# Patient Record
Sex: MALE | Race: WHITE
[De-identification: names, ages, dates, MRNs, and addresses within clinical notes are randomized per-mention and may not be internally consistent; named-entity substitution may affect disease eponyms.]

---

## 2023-05-17 ENCOUNTER — HOSPITAL ENCOUNTER (EMERGENCY)
Dept: HOSPITAL 87 - ER | Age: 38
LOS: 1 days | Discharge: HOME | End: 2023-05-18
Payer: COMMERCIAL

## 2023-05-17 VITALS — WEIGHT: 185.19 LBS | BODY MASS INDEX: 26.51 KG/M2 | HEIGHT: 70 IN

## 2023-05-17 VITALS — DIASTOLIC BLOOD PRESSURE: 85 MMHG | SYSTOLIC BLOOD PRESSURE: 125 MMHG

## 2023-05-17 DIAGNOSIS — Z88.5: ICD-10-CM
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
## 2023-05-17 DIAGNOSIS — T15.92XA: Primary | ICD-10-CM
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
## 2023-05-17 PROCEDURE — 99283 EMERGENCY DEPT VISIT LOW MDM: CPT

## 2023-05-18 ENCOUNTER — NURSE TRIAGE (OUTPATIENT)
Dept: NURSING | Facility: CLINIC | Age: 38
End: 2023-05-18

## 2023-05-18 ENCOUNTER — TELEPHONE (OUTPATIENT)
Dept: CALL CENTER | Age: 38
End: 2023-05-18
Payer: COMMERCIAL

## 2023-05-18 NOTE — TELEPHONE ENCOUNTER
Nurse Triage SBAR    Is this a 2nd Level Triage?  Yes    Situation:   Left eye pain, watery, red, blurred vision    Background/Assessment:    Pt was playing paint ball yesterday with some friends.    Did not get hit in the eye directly with anything.    But was rubbing his eyes constantly yesterday afternoon.    Later in the day.  Pt noticed his left eye was very swollen, red, irritated, painful, blurred vision.          Pt went to the ER.  Was seen by ER providers.  Pt got his eye flushed out and a med to numb the eye for the night until he could be seen in clinic.      The ER Provider thought they could see some particles in the left eye.    So was told to follow up with the eye doctor for further assistance.    Today the eye is red, swollen, painful, irritated, blurred vision and woke up with some matter stuck to his left eye.  No headaches, flashes of light, floaters or dizziness noted.      Pt would like to be seen in clinic.  Pt can be seen tomorrow afternoon.    If calling the Pt after 3:00 PM today.  [place].   So please leave a detail message with a direct line number to speak to a live person in clinic.   Please call [phone] for further assistance.    Thank you     America Verdugo RN  Central Triage Red Flags/Med Refills      Protocol Recommended Disposition:   See in Office Today, See More Appropriate Protocol      Reason for Disposition    Eye pain from foreign body in eye    Patient wants to be seen    Additional Information    Negative: Followed an eye injury    Negative: Eye pain from chemical in the eye    Negative: Doesn't sound like FB in the eye    Negative: Foreign body is a chemical    Negative: Foreign body (FB) hit eye at high speed (e.g., small metallic chip from hammering, lawnmower, BB gun, explosion)    Negative: Foreign body (FB) stuck on eyeball    Negative: Sharp FB (even if FB was removed) and any pain present now    Negative: Eye has been washed out > 30  minutes ago and still feels like FB is still present    Negative: Blurred vision that persists >1 hour after irrigation (regardless of duration of flushing)    Negative: Eye pain that persists > 1 hour after irrigation (regardless of duration of flushing)    Negative: Tearing or blinking that persists > 1 hour since irrigation (regardless of duration of flushing)    Negative: Cloudy spot on the cornea (clear part of the eye)    Negative: Sounds like a serious injury to the triager    Negative: SEVERE eye pain    Negative: Yellow or green pus occurs    Negative: Contact lens stuck in eye and unable to remove using CARE ADVICE    Protocols used: EYE PAIN-A-OH, EYE - FOREIGN BODY-A-OH

## 2023-05-18 NOTE — TELEPHONE ENCOUNTER
Caller reporting the following red-flag symptom(s): Debré in eye or corneal tear    Per the system red-flag symptom policy, patient was instructed to:  speak with a Registered Nurse    Action:  Patient warm transferred to a Registered NurseMary

## 2023-05-18 NOTE — TELEPHONE ENCOUNTER
Reviewed with Dr. Marshall and Dr. Marshall able to see today    Pt states traveling from [place] to [facility] today and arriving at 6 PM    Scheduled exam tomorrow with Dr. Stevenson in afternoon (pt has work obligation in AM0    Pt aware may proceed to [address] [place] [address] ED for any worsening symptoms/vision changes today/tonight    Pt state symptoms/vision have not worsening since ED visit yesterday    Recommended preservative free tears and may use lubricating eye ointment at night until visit    Merlin Unger RN 12:39 PM 05/18/23

## 2023-05-19 ENCOUNTER — OFFICE VISIT (OUTPATIENT)
Dept: OPHTHALMOLOGY | Facility: CLINIC | Age: 38
End: 2023-05-19
Attending: OPTOMETRIST
Payer: COMMERCIAL

## 2023-05-19 DIAGNOSIS — H18.20 CORNEAL EDEMA: Primary | ICD-10-CM

## 2023-05-19 PROCEDURE — 99203 OFFICE O/P NEW LOW 30 MIN: CPT | Performed by: OPTOMETRIST

## 2023-05-19 PROCEDURE — 99211 OFF/OP EST MAY X REQ PHY/QHP: CPT | Performed by: OPTOMETRIST

## 2023-05-19 ASSESSMENT — CONF VISUAL FIELD
OS_NORMAL: 1
OS_INFERIOR_TEMPORAL_RESTRICTION: 0
OD_INFERIOR_TEMPORAL_RESTRICTION: 0
METHOD: COUNTING FINGERS
OD_INFERIOR_NASAL_RESTRICTION: 0
OD_NORMAL: 1
OD_SUPERIOR_TEMPORAL_RESTRICTION: 0
OS_SUPERIOR_TEMPORAL_RESTRICTION: 0
OS_INFERIOR_NASAL_RESTRICTION: 0
OD_SUPERIOR_NASAL_RESTRICTION: 0
OS_SUPERIOR_NASAL_RESTRICTION: 0

## 2023-05-19 ASSESSMENT — VISUAL ACUITY
OS_SC+: -1
OS_PH_SC: 20/25
METHOD: SNELLEN - LINEAR
OS_PH_SC+: -1
OS_SC: 20/40
OD_SC: 20/20

## 2023-05-19 ASSESSMENT — SLIT LAMP EXAM - LIDS
COMMENTS: NORMAL
COMMENTS: NORMAL

## 2023-05-19 ASSESSMENT — EXTERNAL EXAM - RIGHT EYE: OD_EXAM: NORMAL

## 2023-05-19 ASSESSMENT — TONOMETRY
IOP_METHOD: ICARE
OS_IOP_MMHG: 12
OD_IOP_MMHG: 13

## 2023-05-19 ASSESSMENT — EXTERNAL EXAM - LEFT EYE: OS_EXAM: NORMAL

## 2023-05-19 NOTE — PROGRESS NOTES
Assessment & Plan       Dejuan Barron is a 37 year old male with the following diagnoses:   1. Corneal edema - Right Eye        cont lubricants for 3-4 days  Full exam per 2 years     Patient disposition:   No follow-ups on file.          Complete documentation of historical and exam elements from today's encounter can be found in the full encounter summary report (not reduplicated in this progress note). I personally obtained the chief complaint(s) and history of present illness.  I confirmed and edited as necessary the review of systems, past medical/surgical history, family history, social history, and examination findings as documented by others; and I examined the patient myself. I personally reviewed the relevant tests, images, and reports as documented above. I formulated and edited as necessary the assessment and plan and discussed the findings and management plan with the patient and family.  Dr. Nir Stevenson

## 2023-05-19 NOTE — NURSING NOTE
Chief Complaints and History of Present Illnesses   Patient presents with     Foreign Body Left Eye     Left eye     Chief Complaint(s) and History of Present Illness(es)     Foreign Body Left Eye            Comments: Left eye          Comments    Pt states he was at an outing playing [employer], went to ED in [place] with eye pain left eye (not hit with any debris)  States they flushed the the eye and sent him home  Pt states no flashes, floaters eye pain or redness today    Mallika Acosta COT 12:43 PM May 19, 2023

## 2023-05-21 ENCOUNTER — HEALTH MAINTENANCE LETTER (OUTPATIENT)
Age: 38
End: 2023-05-21

## 2023-06-08 ENCOUNTER — OFFICE VISIT (OUTPATIENT)
Dept: FAMILY MEDICINE | Facility: CLINIC | Age: 38
End: 2023-06-08
Payer: COMMERCIAL

## 2023-06-08 VITALS
WEIGHT: 196.5 LBS | HEIGHT: 69 IN | HEART RATE: 74 BPM | SYSTOLIC BLOOD PRESSURE: 118 MMHG | RESPIRATION RATE: 15 BRPM | OXYGEN SATURATION: 97 % | BODY MASS INDEX: 29.1 KG/M2 | DIASTOLIC BLOOD PRESSURE: 79 MMHG | TEMPERATURE: 98.3 F

## 2023-06-08 DIAGNOSIS — R29.818 SUSPECTED SLEEP APNEA: ICD-10-CM

## 2023-06-08 DIAGNOSIS — Z00.00 VISIT FOR PREVENTIVE HEALTH EXAMINATION: Primary | ICD-10-CM

## 2023-06-08 DIAGNOSIS — Z80.8 FAMILY HISTORY OF SKIN CANCER: ICD-10-CM

## 2023-06-08 DIAGNOSIS — Z13.1 SCREENING FOR DIABETES MELLITUS: ICD-10-CM

## 2023-06-08 DIAGNOSIS — Z13.220 LIPID SCREENING: ICD-10-CM

## 2023-06-08 PROCEDURE — 99213 OFFICE O/P EST LOW 20 MIN: CPT | Mod: 25 | Performed by: FAMILY MEDICINE

## 2023-06-08 PROCEDURE — 99385 PREV VISIT NEW AGE 18-39: CPT | Performed by: FAMILY MEDICINE

## 2023-06-08 ASSESSMENT — ENCOUNTER SYMPTOMS
SORE THROAT: 0
MYALGIAS: 1
ARTHRALGIAS: 0
FEVER: 0
CHILLS: 0
EYE PAIN: 0
DIZZINESS: 0
DIARRHEA: 0
SHORTNESS OF BREATH: 0
ABDOMINAL PAIN: 0
DYSURIA: 0
PARESTHESIAS: 0
HEMATURIA: 0
JOINT SWELLING: 0
HEMATOCHEZIA: 0
NAUSEA: 0
PALPITATIONS: 0
WEAKNESS: 0
NERVOUS/ANXIOUS: 0
HEADACHES: 0
CONSTIPATION: 0
HEARTBURN: 0
COUGH: 0
FREQUENCY: 0

## 2023-06-08 ASSESSMENT — PAIN SCALES - GENERAL: PAINLEVEL: MILD PAIN (3)

## 2023-06-08 NOTE — PROGRESS NOTES
"Assessment/Plan    Preventive.  -see below orders for screening tests and immunizations  -  Patient reports that he received bivalent COVID booster  - patient reports that he received usual childhood vaccines  -denies STI concerns    Snoring.  Also with sensation of throat closing upon waking.  Will refer for sleep study.    Family history of pre-melanotic lesion.  Will refer to dermatology.    F/u annually.    Orders Placed This Encounter   Procedures     Lipid panel reflex to direct LDL Fasting     Hemoglobin A1c     Adult Sleep Eval & Management  Referral     Adult Dermatology Referral     ----  Chief complaint: Physical, Referral (derm), and Establish Care    Social History     Social History Narrative    Updated 6/8/2023: Grew up in [place].  Lives with wife (orthopedic surgeon) and daughter (born around 2022).  Has a dog.  Works as  for [employer].     Patient has been advised of split billing: yes.    Snoring.  No pauses in breathing with sleep.  Occasional sensation of throat closing upon waking.  Sleep is restful.  No family history of sleep apnea.    Skin lesions.  Multiple moles.  Father with history of squamous cell carcinoma and possibly a pre-melanotic lesion.    Prediabetes in past, but last A1c was within the normal range.    Exam  /79   Pulse 74   Temp 98.3  F (36.8  C) (Temporal)   Resp 15   Ht 1.746 m (5' 8.74\")   Wt 89.1 kg (196 lb 8 oz)   SpO2 97%   BMI 29.24 kg/m       Heart with regular rate and rhythm, no murmurs.  Lung fields clear to auscultation bilaterally.  Oropharynx without abnormal masses.  No cervical adenopathy.  "

## 2023-06-11 PROBLEM — Z14.1 CYSTIC FIBROSIS CARRIER: Status: ACTIVE | Noted: 2023-06-11

## 2023-07-02 ENCOUNTER — ANCILLARY PROCEDURE (OUTPATIENT)
Dept: GENERAL RADIOLOGY | Facility: CLINIC | Age: 38
End: 2023-07-02
Attending: NURSE PRACTITIONER
Payer: COMMERCIAL

## 2023-07-02 ENCOUNTER — OFFICE VISIT (OUTPATIENT)
Dept: URGENT CARE | Facility: URGENT CARE | Age: 38
End: 2023-07-02
Payer: COMMERCIAL

## 2023-07-02 VITALS
TEMPERATURE: 98.8 F | OXYGEN SATURATION: 95 % | RESPIRATION RATE: 12 BRPM | HEART RATE: 86 BPM | WEIGHT: 196.5 LBS | DIASTOLIC BLOOD PRESSURE: 75 MMHG | SYSTOLIC BLOOD PRESSURE: 110 MMHG | BODY MASS INDEX: 29.24 KG/M2

## 2023-07-02 DIAGNOSIS — S92.414A NONDISPLACED FRACTURE OF PROXIMAL PHALANX OF RIGHT GREAT TOE, INITIAL ENCOUNTER FOR CLOSED FRACTURE: Primary | ICD-10-CM

## 2023-07-02 DIAGNOSIS — S90.111A CONTUSION OF RIGHT GREAT TOE WITHOUT DAMAGE TO NAIL, INITIAL ENCOUNTER: ICD-10-CM

## 2023-07-02 DIAGNOSIS — S99.921A INJURY OF RIGHT GREAT TOE, INITIAL ENCOUNTER: ICD-10-CM

## 2023-07-02 PROCEDURE — 73660 X-RAY EXAM OF TOE(S): CPT | Mod: TC | Performed by: RADIOLOGY

## 2023-07-02 PROCEDURE — 99214 OFFICE O/P EST MOD 30 MIN: CPT | Performed by: NURSE PRACTITIONER

## 2023-07-02 NOTE — PROGRESS NOTES
Chief Complaint   Patient presents with     Urgent Care     Pain     Right foot/big toe injury today, patient states he kicked a concrete piller, bruising, painful with movemennt     SUBJECTIVE:  Dejuan Barron is a 38 year old male who presents to the clinic today with right great toe injury that occurred today.  He accidentally kicked a concrete pillar and has bruising redness swelling tenderness to his proximal phalanx.  No numbness tingling pallor cool sensation lost range of motion. Wife is an ortho surgeon.    Past Medical History:   Diagnosis Date     Varicella      No current outpatient medications on file prior to visit.  No current facility-administered medications on file prior to visit.    Social History     Tobacco Use     Smoking status: Never     Smokeless tobacco: Never   Substance Use Topics     Alcohol use: Yes     Alcohol/week: 2.0 standard drinks of alcohol     Types: 2 Standard drinks or equivalent per week     Allergies   Allergen Reactions     Cefaclor Hives and Rash     Happened as an infant        Review of Systems   All systems negative except for those listed above in HPI.    EXAM:   /75   Pulse 86   Temp 98.8  F (37.1  C) (Oral)   Resp 12   Wt 89.1 kg (196 lb 8 oz)   SpO2 95%   BMI 29.24 kg/m      Physical Exam  Vitals reviewed.   Constitutional:       Appearance: Normal appearance.   HENT:      Head: Normocephalic and atraumatic.      Nose: Nose normal.      Mouth/Throat:      Mouth: Mucous membranes are moist.      Pharynx: Oropharynx is clear.   Eyes:      Extraocular Movements: Extraocular movements intact.      Conjunctiva/sclera: Conjunctivae normal.      Pupils: Pupils are equal, round, and reactive to light.   Cardiovascular:      Rate and Rhythm: Normal rate.      Pulses: Normal pulses.   Pulmonary:      Effort: Pulmonary effort is normal.   Musculoskeletal:         General: Tenderness and signs of injury present. Normal range of motion.      Cervical back: Normal  range of motion and neck supple.   Skin:     General: Skin is warm and dry.      Findings: Bruising and erythema present. No rash.   Neurological:      General: No focal deficit present.      Mental Status: He is alert and oriented to person, place, and time.      Sensory: No sensory deficit.   Psychiatric:         Mood and Affect: Mood normal.         Behavior: Behavior normal.       X-ray done in clinic read by me as positive for proximal phalanx nondisplaced fractures.  Results for orders placed or performed in visit on 07/02/23   XR Toe Right G/E 2 Views     Status: None    Narrative    EXAM: XR TOE RIGHT G/E 2 VIEWS  LOCATION: [facility]  DATE: 7/2/2023    INDICATION: Right first toe injury and pain.  COMPARISON: None.      Impression    IMPRESSION:   1.  Oblique nondisplaced fracture of the first toe proximal phalanx base and shaft. Probable intra-articular extension into the first MTP joint.  2.  Normal joint spacing and alignment.      ASSESSMENT:    ICD-10-CM    1. Nondisplaced fracture of proximal phalanx of right great toe, initial encounter for closed fracture  S92.414A Ankle/Foot Bracing Supplies DME Walking Boot; Right; Non-pneumatic; Short     Orthopedic  Referral     CANCELED: Orthopedic  Referral      2. Injury of right great toe, initial encounter  S99.921A XR Toe Right G/E 2 Views      3. Contusion of right great toe without damage to nail, initial encounter  S90.111A         PLAN:     Right great toe fracture per radiologist  They prefer podiatry follow-up at [facility]  Rest ice elevate  Boot dme placed, wife will come by to grab in the next 1-2 days  Rotate Tylenol ibuprofen  Weightbearing as tolerated    Follow up with primary care provider with any problems, questions or concerns or if symptoms worsen or fail to improve. Patient agreed to plan and verbalized understanding.    Sissy Gurrola, PHIL-BC  [facility] C[facility] [place]

## 2023-07-24 NOTE — TELEPHONE ENCOUNTER
DIAGNOSIS: 1st toe proximal phalanx fracture (r)   APPOINTMENT DATE: 07/27/23   NOTES STATUS DETAILS   DISCHARGE SUMMARY from hospital Internal 07/02/23- [facility] UC:  - Sissy Gurrola NP   XRAYS (IMAGES & REPORTS) Internal 07/02/23- [facility] UC:  - XR R Toe - Sissy Gurrola NP

## 2023-07-25 DIAGNOSIS — M79.671 RIGHT FOOT PAIN: Primary | ICD-10-CM

## 2023-07-27 ENCOUNTER — PRE VISIT (OUTPATIENT)
Dept: ORTHOPEDICS | Facility: CLINIC | Age: 38
End: 2023-07-27
Payer: COMMERCIAL

## 2023-07-27 ENCOUNTER — ANCILLARY PROCEDURE (OUTPATIENT)
Dept: GENERAL RADIOLOGY | Facility: CLINIC | Age: 38
End: 2023-07-27
Attending: FAMILY MEDICINE
Payer: COMMERCIAL

## 2023-07-27 ENCOUNTER — OFFICE VISIT (OUTPATIENT)
Dept: ORTHOPEDICS | Facility: CLINIC | Age: 38
End: 2023-07-27
Payer: COMMERCIAL

## 2023-07-27 DIAGNOSIS — S92.414A NONDISPLACED FRACTURE OF PROXIMAL PHALANX OF RIGHT GREAT TOE, INITIAL ENCOUNTER FOR CLOSED FRACTURE: ICD-10-CM

## 2023-07-27 PROCEDURE — 73660 X-RAY EXAM OF TOE(S): CPT | Mod: RT | Performed by: RADIOLOGY

## 2023-07-27 PROCEDURE — 99203 OFFICE O/P NEW LOW 30 MIN: CPT | Performed by: FAMILY MEDICINE

## 2023-07-27 NOTE — LETTER
7/27/2023      RE: Dejuan Barron  [address]  [place] [address]     Dear Colleague,    Thank you for referring your patient, Dejuan Barron, to the [facility] [place]. Please see a copy of my visit note below.    Right Great toe fx x 4 wks    Patient feels that the toe is improved.  He wore strong supportive device for walking for 2 weeks and then discontinued it 2 weeks ago.  Minimal discomfort with weightbearing.      Pt accidentally kicked a concrete pillar 7/2/2023        Wife is an orthopedic surgeon, [employer]    EXAM: XR TOE RIGHT G/E 2 VIEWS  LOCATION: [facility]  DATE: 7/2/2023     INDICATION: Right first toe injury and pain.  COMPARISON: None.                                                                      IMPRESSION:   1.  Oblique nondisplaced fracture of the first toe proximal phalanx base and shaft. Probable intra-articular extension into the first MTP joint.  2.  Normal joint spacing and alignment.         PMH:  Past Medical History:   Diagnosis Date    Varicella        Active problem list:  Patient Active Problem List   Diagnosis    Cystic fibrosis carrier       FH:  Family History   Problem Relation Age of Onset    Alcoholism Mother     Skin Cancer Father         squamous cell and pre-melanoma    Colon Polyps Maternal Grandfather         maybe    Diabetes No family hx of     Glaucoma No family hx of     Macular Degeneration No family hx of     Prostate Cancer No family hx of        SH:  Social History     Socioeconomic History    Marital status:      Spouse name: Not on file    Number of children: Not on file    Years of education: Not on file    Highest education level: Not on file   Occupational History    Not on file   Tobacco Use    Smoking status: Never    Smokeless tobacco: Never   Vaping Use    Vaping Use: Never used   Substance and Sexual Activity    Alcohol use: Yes     Alcohol/week: 2.0 standard  drinks of alcohol     Types: 2 Standard drinks or equivalent per week    Drug use: Not Currently    Sexual activity: Not on file   Other Topics Concern    Not on file   Social History Narrative    Updated 6/8/2023: Grew up in [place].  Lives with wife (orthopedic surgeon) and daughter (born around 2022).  Has a dog.  Works as  for [employer].     Social Determinants of Health     Financial Resource Strain: Not on file   Food Insecurity: Not on file   Transportation Needs: Not on file   Physical Activity: Not on file   Stress: Not on file   Social Connections: Not on file   Intimate Partner Violence: Not on file   Housing Stability: Not on file       MEDS:  See EMR, reviewed  ALL:  See EMR, reviewed    REVIEW OF SYSTEMS:  CONSTITUTIONAL:NEGATIVE for fever, chills, change in weight  INTEGUMENTARY/SKIN: NEGATIVE for worrisome rashes, moles or lesions  EYES: NEGATIVE for vision changes or irritation  ENT/MOUTH: NEGATIVE for ear, mouth and throat problems  RESP:NEGATIVE for significant cough or SOB  BREAST: NEGATIVE for masses, tenderness or discharge  CV: NEGATIVE for chest pain, palpitations or peripheral edema  GI: NEGATIVE for nausea, abdominal pain, heartburn, or change in bowel habits  :NEGATIVE for frequency, dysuria, or hematuria  :NEGATIVE for frequency, dysuria, or hematuria  NEURO: NEGATIVE for weakness, dizziness or paresthesias  ENDOCRINE: NEGATIVE for temperature intolerance, skin/hair changes  HEME/ALLERGY/IMMUNE: NEGATIVE for bleeding problems  PSYCHIATRIC: NEGATIVE for changes in mood or affect    Objective: He is nontender in the area of the Lisfranc joint.  Nontender the Achilles tendon, peroneal or posterior tibial tendon.  Nontender over the proximal fifth metatarsal.  No significant tenderness over the proximal phalanx of the great toe, the site of the fracture.  He will flex and extend against resistance with good strength.  Overlying skin is intact.  Appropriate conversation and  affect.    I personally viewed with the patient updated x-rays that show ongoing healing of the nondisplaced oblique fracture of the proximal phalanx of the first digit.    Assessment great toe fracture, healing    Plan: He plans on using a supportive shoe over the next 4 weeks.  He does not have any upcoming athletic activities or demands in the next month.  Follow-up in 1 month for repeat x-rays.  He is hoping to return to running in the future.      Again, thank you for allowing me to participate in the care of your patient.      Sincerely,    Osmani Rodriguez MD

## 2023-07-27 NOTE — PROGRESS NOTES
Right Great toe fx x 4 wks    Patient feels that the toe is improved.  He wore strong supportive device for walking for 2 weeks and then discontinued it 2 weeks ago.  Minimal discomfort with weightbearing.      Pt accidentally kicked a concrete pillar 7/2/2023        Wife is an orthopedic surgeon, [employer]    EXAM: XR TOE RIGHT G/E 2 VIEWS  LOCATION: [facility]  DATE: 7/2/2023     INDICATION: Right first toe injury and pain.  COMPARISON: None.                                                                      IMPRESSION:   1.  Oblique nondisplaced fracture of the first toe proximal phalanx base and shaft. Probable intra-articular extension into the first MTP joint.  2.  Normal joint spacing and alignment.         PMH:  Past Medical History:   Diagnosis Date    Varicella        Active problem list:  Patient Active Problem List   Diagnosis    Cystic fibrosis carrier       FH:  Family History   Problem Relation Age of Onset    Alcoholism Mother     Skin Cancer Father         squamous cell and pre-melanoma    Colon Polyps Maternal Grandfather         maybe    Diabetes No family hx of     Glaucoma No family hx of     Macular Degeneration No family hx of     Prostate Cancer No family hx of        SH:  Social History     Socioeconomic History    Marital status:      Spouse name: Not on file    Number of children: Not on file    Years of education: Not on file    Highest education level: Not on file   Occupational History    Not on file   Tobacco Use    Smoking status: Never    Smokeless tobacco: Never   Vaping Use    Vaping Use: Never used   Substance and Sexual Activity    Alcohol use: Yes     Alcohol/week: 2.0 standard drinks of alcohol     Types: 2 Standard drinks or equivalent per week    Drug use: Not Currently    Sexual activity: Not on file   Other Topics Concern    Not on file   Social History Narrative    Updated 6/8/2023: Grew up in [place].  Lives with wife (orthopedic surgeon) and  daughter (born around 2022).  Has a dog.  Works as  for [employer].     Social Determinants of Health     Financial Resource Strain: Not on file   Food Insecurity: Not on file   Transportation Needs: Not on file   Physical Activity: Not on file   Stress: Not on file   Social Connections: Not on file   Intimate Partner Violence: Not on file   Housing Stability: Not on file       MEDS:  See EMR, reviewed  ALL:  See EMR, reviewed    REVIEW OF SYSTEMS:  CONSTITUTIONAL:NEGATIVE for fever, chills, change in weight  INTEGUMENTARY/SKIN: NEGATIVE for worrisome rashes, moles or lesions  EYES: NEGATIVE for vision changes or irritation  ENT/MOUTH: NEGATIVE for ear, mouth and throat problems  RESP:NEGATIVE for significant cough or SOB  BREAST: NEGATIVE for masses, tenderness or discharge  CV: NEGATIVE for chest pain, palpitations or peripheral edema  GI: NEGATIVE for nausea, abdominal pain, heartburn, or change in bowel habits  :NEGATIVE for frequency, dysuria, or hematuria  :NEGATIVE for frequency, dysuria, or hematuria  NEURO: NEGATIVE for weakness, dizziness or paresthesias  ENDOCRINE: NEGATIVE for temperature intolerance, skin/hair changes  HEME/ALLERGY/IMMUNE: NEGATIVE for bleeding problems  PSYCHIATRIC: NEGATIVE for changes in mood or affect      Objective: He is nontender in the area of the Lisfranc joint.  Nontender the Achilles tendon, peroneal or posterior tibial tendon.  Nontender over the proximal fifth metatarsal.  No significant tenderness over the proximal phalanx of the great toe, the site of the fracture.  He will flex and extend against resistance with good strength.  Overlying skin is intact.  Appropriate conversation and affect.    I personally viewed with the patient updated x-rays that show ongoing healing of the nondisplaced oblique fracture of the proximal phalanx of the first digit.    Assessment great toe fracture, healing    Plan: He plans on using a supportive shoe over the next 4  weeks.  He does not have any upcoming athletic activities or demands in the next month.  Follow-up in 1 month for repeat x-rays.  He is hoping to return to running in the future.

## 2023-08-22 DIAGNOSIS — S92.414A NONDISPLACED FRACTURE OF PROXIMAL PHALANX OF RIGHT GREAT TOE, INITIAL ENCOUNTER FOR CLOSED FRACTURE: Primary | ICD-10-CM

## 2023-08-24 ENCOUNTER — OFFICE VISIT (OUTPATIENT)
Dept: ORTHOPEDICS | Facility: CLINIC | Age: 38
End: 2023-08-24
Payer: COMMERCIAL

## 2023-08-24 ENCOUNTER — ANCILLARY PROCEDURE (OUTPATIENT)
Dept: GENERAL RADIOLOGY | Facility: CLINIC | Age: 38
End: 2023-08-24
Attending: FAMILY MEDICINE
Payer: COMMERCIAL

## 2023-08-24 VITALS — WEIGHT: 196 LBS | BODY MASS INDEX: 29.03 KG/M2 | HEIGHT: 69 IN

## 2023-08-24 DIAGNOSIS — Z98.890 H/O ANTERIOR CRUCIATE LIGAMENT SURGERY: ICD-10-CM

## 2023-08-24 DIAGNOSIS — S92.414A NONDISPLACED FRACTURE OF PROXIMAL PHALANX OF RIGHT GREAT TOE, INITIAL ENCOUNTER FOR CLOSED FRACTURE: ICD-10-CM

## 2023-08-24 DIAGNOSIS — S92.414D CLOSED NONDISPLACED FRACTURE OF PROXIMAL PHALANX OF RIGHT GREAT TOE WITH ROUTINE HEALING, SUBSEQUENT ENCOUNTER: Primary | ICD-10-CM

## 2023-08-24 PROCEDURE — 99213 OFFICE O/P EST LOW 20 MIN: CPT | Performed by: FAMILY MEDICINE

## 2023-08-24 PROCEDURE — 73660 X-RAY EXAM OF TOE(S): CPT | Mod: RT | Performed by: RADIOLOGY

## 2023-08-24 NOTE — LETTER
8/24/2023      RE: Dejuan Barron  [address]  [place] [address]     Dear Colleague,    Thank you for referring your patient, Dejuan Barron, to the [facility] [place]. Please see a copy of my visit note below.    August 24, 2023: Dejuan Barron is a 38 year old male who is seen in f/u up for a right great toe fracture x 5 weeks  DOI: Pt accidentally kicked a concrete pillar 7/2/2023     He feels that the right great toe is improved.  Only occasional minimal pain.  He has been using regular street shoes.      Patient is status post a right ACL procedure 10 years ago.  He feels that he may have had a reinjury to his graft.  He indicates it was a bone patellar bone graft.  Approximately 4 to 6 weeks ago he was playing paint ball.  He does not remember a particular injury.  But afterwards he could feel some discomfort in the knee with lateral movements and angular movements.  The knee does not recurrently swell or lock or catch.      PMH:  Past Medical History:   Diagnosis Date    Fracture of great toe, right 07/2023    Varicella        Active problem list:  Patient Active Problem List   Diagnosis    Cystic fibrosis carrier       FH:  Family History   Problem Relation Age of Onset    Alcoholism Mother     Skin Cancer Father         squamous cell and pre-melanoma    Colon Polyps Maternal Grandfather         maybe    Diabetes No family hx of     Glaucoma No family hx of     Macular Degeneration No family hx of     Prostate Cancer No family hx of        SH:  Social History     Socioeconomic History    Marital status:      Spouse name: Not on file    Number of children: Not on file    Years of education: Not on file    Highest education level: Not on file   Occupational History    Not on file   Tobacco Use    Smoking status: Never    Smokeless tobacco: Never   Vaping Use    Vaping Use: Never used   Substance and Sexual Activity    Alcohol use: Yes      Alcohol/week: 2.0 standard drinks of alcohol     Types: 2 Standard drinks or equivalent per week    Drug use: Not Currently    Sexual activity: Not on file   Other Topics Concern    Not on file   Social History Narrative    Updated 6/8/2023: Grew up in [place].  Lives with wife (orthopedic surgeon) and daughter (born around 2022).  Has a dog.  Works as  for [employer].     Social Determinants of Health     Financial Resource Strain: Not on file   Food Insecurity: Not on file   Transportation Needs: Not on file   Physical Activity: Not on file   Stress: Not on file   Social Connections: Not on file   Intimate Partner Violence: Not on file   Housing Stability: Not on file       MEDS:  See EMR, reviewed  ALL:  See EMR, reviewed    REVIEW OF SYSTEMS:  CONSTITUTIONAL:NEGATIVE for fever, chills, change in weight  INTEGUMENTARY/SKIN: NEGATIVE for worrisome rashes, moles or lesions  EYES: NEGATIVE for vision changes or irritation  ENT/MOUTH: NEGATIVE for ear, mouth and throat problems  RESP:NEGATIVE for significant cough or SOB  BREAST: NEGATIVE for masses, tenderness or discharge  CV: NEGATIVE for chest pain, palpitations or peripheral edema  GI: NEGATIVE for nausea, abdominal pain, heartburn, or change in bowel habits  :NEGATIVE for frequency, dysuria, or hematuria  :NEGATIVE for frequency, dysuria, or hematuria  NEURO: NEGATIVE for weakness, dizziness or paresthesias  ENDOCRINE: NEGATIVE for temperature intolerance, skin/hair changes  HEME/ALLERGY/IMMUNE: NEGATIVE for bleeding problems  PSYCHIATRIC: NEGATIVE for changes in mood or affect        Objective: Today he is nontender over the proximal phalanx of the great toe.  Nontender in the area of Lisfranc.  Normal passive volar and dorsiflexion of the great toe.  The right knee reveals no effusion.  Full range of motion.  There is a scar on the anterior right knee from the previous ACL surgery.  Nontender over the medial or lateral joint line.  MCL and LCL  stresses are negative.  Lachman's has a soft endpoint but it appears to be without laxity.  Pivot shift is without glide.  Anterior and posterior drawer are negative.    Assessment: Right-sided great toe fracture, healing.  History of ACL surgery 10 years ago    Plan: He can advance activities as tolerated in a supportive shoe.  I reviewed with the patient his updated x-rays of the great toe that continue to show new bone formation at the proximal phalanx fracture with a congruent joint.  I was reassured by his knee exam today.  We agreed to have a phone call follow-up in 2 months.  If he notes persistent instability symptoms within the knee or mechanical symptoms within the knee an MRI could be considered at that time.  Patient was satisfied with this approach and will discuss his knee again by phone in 2 months.        Again, thank you for allowing me to participate in the care of your patient.      Sincerely,    Osmani Rodriguez MD

## 2023-08-24 NOTE — PROGRESS NOTES
August 24, 2023: Dejuan Barron is a 38 year old male who is seen in f/u up for a right great toe fracture x 5 weeks  DOI: Pt accidentally kicked a concrete pillar 7/2/2023     He feels that the right great toe is improved.  Only occasional minimal pain.  He has been using regular street shoes.      Patient is status post a right ACL procedure 10 years ago.  He feels that he may have had a reinjury to his graft.  He indicates it was a bone patellar bone graft.  Approximately 4 to 6 weeks ago he was playing paint ball.  He does not remember a particular injury.  But afterwards he could feel some discomfort in the knee with lateral movements and angular movements.  The knee does not recurrently swell or lock or catch.      PMH:  Past Medical History:   Diagnosis Date    Fracture of great toe, right 07/2023    Varicella        Active problem list:  Patient Active Problem List   Diagnosis    Cystic fibrosis carrier       FH:  Family History   Problem Relation Age of Onset    Alcoholism Mother     Skin Cancer Father         squamous cell and pre-melanoma    Colon Polyps Maternal Grandfather         maybe    Diabetes No family hx of     Glaucoma No family hx of     Macular Degeneration No family hx of     Prostate Cancer No family hx of        SH:  Social History     Socioeconomic History    Marital status:      Spouse name: Not on file    Number of children: Not on file    Years of education: Not on file    Highest education level: Not on file   Occupational History    Not on file   Tobacco Use    Smoking status: Never    Smokeless tobacco: Never   Vaping Use    Vaping Use: Never used   Substance and Sexual Activity    Alcohol use: Yes     Alcohol/week: 2.0 standard drinks of alcohol     Types: 2 Standard drinks or equivalent per week    Drug use: Not Currently    Sexual activity: Not on file   Other Topics Concern    Not on file   Social History Narrative    Updated 6/8/2023: Grew up in [place].  Lives with wife  (orthopedic surgeon) and daughter (born around 2022).  Has a dog.  Works as  for [employer].     Social Determinants of Health     Financial Resource Strain: Not on file   Food Insecurity: Not on file   Transportation Needs: Not on file   Physical Activity: Not on file   Stress: Not on file   Social Connections: Not on file   Intimate Partner Violence: Not on file   Housing Stability: Not on file       MEDS:  See EMR, reviewed  ALL:  See EMR, reviewed    REVIEW OF SYSTEMS:  CONSTITUTIONAL:NEGATIVE for fever, chills, change in weight  INTEGUMENTARY/SKIN: NEGATIVE for worrisome rashes, moles or lesions  EYES: NEGATIVE for vision changes or irritation  ENT/MOUTH: NEGATIVE for ear, mouth and throat problems  RESP:NEGATIVE for significant cough or SOB  BREAST: NEGATIVE for masses, tenderness or discharge  CV: NEGATIVE for chest pain, palpitations or peripheral edema  GI: NEGATIVE for nausea, abdominal pain, heartburn, or change in bowel habits  :NEGATIVE for frequency, dysuria, or hematuria  :NEGATIVE for frequency, dysuria, or hematuria  NEURO: NEGATIVE for weakness, dizziness or paresthesias  ENDOCRINE: NEGATIVE for temperature intolerance, skin/hair changes  HEME/ALLERGY/IMMUNE: NEGATIVE for bleeding problems  PSYCHIATRIC: NEGATIVE for changes in mood or affect        Objective: Today he is nontender over the proximal phalanx of the great toe.  Nontender in the area of Lisfranc.  Normal passive volar and dorsiflexion of the great toe.  The right knee reveals no effusion.  Full range of motion.  There is a scar on the anterior right knee from the previous ACL surgery.  Nontender over the medial or lateral joint line.  MCL and LCL stresses are negative.  Lachman's has a soft endpoint but it appears to be without laxity.  Pivot shift is without glide.  Anterior and posterior drawer are negative.    Assessment: Right-sided great toe fracture, healing.  History of ACL surgery 10 years ago    Plan: He can  advance activities as tolerated in a supportive shoe.  I reviewed with the patient his updated x-rays of the great toe that continue to show new bone formation at the proximal phalanx fracture with a congruent joint.  I was reassured by his knee exam today.  We agreed to have a phone call follow-up in 2 months.  If he notes persistent instability symptoms within the knee or mechanical symptoms within the knee an MRI could be considered at that time.  Patient was satisfied with this approach and will discuss his knee again by phone in 2 months.

## 2023-10-04 ENCOUNTER — OFFICE VISIT (OUTPATIENT)
Dept: SLEEP MEDICINE | Facility: CLINIC | Age: 38
End: 2023-10-04
Attending: FAMILY MEDICINE
Payer: COMMERCIAL

## 2023-10-04 VITALS
HEIGHT: 68 IN | WEIGHT: 200 LBS | RESPIRATION RATE: 16 BRPM | BODY MASS INDEX: 30.31 KG/M2 | OXYGEN SATURATION: 97 % | DIASTOLIC BLOOD PRESSURE: 71 MMHG | HEART RATE: 73 BPM | SYSTOLIC BLOOD PRESSURE: 108 MMHG

## 2023-10-04 DIAGNOSIS — R29.818 SUSPECTED SLEEP APNEA: ICD-10-CM

## 2023-10-04 DIAGNOSIS — G47.33 OSA (OBSTRUCTIVE SLEEP APNEA): Primary | ICD-10-CM

## 2023-10-04 PROCEDURE — 99205 OFFICE O/P NEW HI 60 MIN: CPT | Performed by: INTERNAL MEDICINE

## 2023-10-04 ASSESSMENT — SLEEP AND FATIGUE QUESTIONNAIRES
HOW LIKELY ARE YOU TO NOD OFF OR FALL ASLEEP WHILE SITTING AND TALKING TO SOMEONE: WOULD NEVER DOZE
HOW LIKELY ARE YOU TO NOD OFF OR FALL ASLEEP WHILE LYING DOWN TO REST IN THE AFTERNOON WHEN CIRCUMSTANCES PERMIT: MODERATE CHANCE OF DOZING
HOW LIKELY ARE YOU TO NOD OFF OR FALL ASLEEP WHEN YOU ARE A PASSENGER IN A CAR FOR AN HOUR WITHOUT A BREAK: SLIGHT CHANCE OF DOZING
HOW LIKELY ARE YOU TO NOD OFF OR FALL ASLEEP IN A CAR, WHILE STOPPED FOR A FEW MINUTES IN TRAFFIC: WOULD NEVER DOZE
HOW LIKELY ARE YOU TO NOD OFF OR FALL ASLEEP WHILE SITTING QUIETLY AFTER LUNCH WITHOUT ALCOHOL: SLIGHT CHANCE OF DOZING
HOW LIKELY ARE YOU TO NOD OFF OR FALL ASLEEP WHILE SITTING AND READING: WOULD NEVER DOZE
HOW LIKELY ARE YOU TO NOD OFF OR FALL ASLEEP WHILE SITTING INACTIVE IN A PUBLIC PLACE: WOULD NEVER DOZE
HOW LIKELY ARE YOU TO NOD OFF OR FALL ASLEEP WHILE WATCHING TV: SLIGHT CHANCE OF DOZING

## 2023-10-04 NOTE — NURSING NOTE
Scheduled HST for 11/20/2023 with follow up on 12/5/2023. Printed AVS    [facility]   Clinic Facilitator   [facility]

## 2023-10-04 NOTE — PROGRESS NOTES
Name: Dejuan Barron MRN# [id number]   Age: 38 year old YOB: 1985     Date of Consultation: October 4, 2023  Consultation is requested by: Bigg Tay MD  [address] [place] [address] [address] [place] [id number] Bigg Tay  Primary care provider: Bigg Tay       Reason for Sleep Consult:     Patient s Reason for visit  Dejuan Barron main reason for visit: Sleep interrupted  Patient states problem(s) started: 10 years  Dejuan KUNAL Anderson's goals for this visit: Information           Assessment and Plan:     Summary Sleep Diagnoses:  Clinical signs and symptoms of obstructive sleep apnea  Mid sleep awakenings    Comorbid Diagnoses:  Mild situational anxiety    Summary Recommendations(things to be done):  Avoid use of electronic equipment in the bedroom when possible  Home sleep test with consideration for long-term weight loss of 10 to 15 pounds, sleep positioning device or mandibular advancement device for mild positional sleep apnea  Patient should review information on insomnia and sleep apnea in the after visit summary  Results from the sleep test will be forwarded to him for next steps  A follow-up appointment in 3 to 4 months will be scheduled in the event that we initiate therapy plan for his sleep apnea to verify effectiveness.    Summary Counseling (items discussed):    We discussed hypervigilant arousals in the setting of his mid sleep awakenings and use of electronic equipment in the room with potential approaches to to managing the symptoms and consideration for cognitive behavioral therapy for insomnia if his insomnia became more burdensome.  We discussed potential underpinning causes for his mid sleep awakenings and the moderate probability of obstructive sleep apnea based on snoring and gasping at night with awakenings.  In the event that there is sleep apnea we discussed treatments for mild and moderate to severe sleep apnea indicating that medical risks is only associated  with moderate to severe sleep apnea particularly if it is associated with significant oxygen reductions.           HISTORY PRESENT ILLNESS:     Dejuan Barron is a 38 year old year old with 16 month old daughter with new onset midsleep awakening with gasping at night and hypervigilant arousals to noise and gasping. He has nightly snoring and has been aware of worsening of his sleep after moving to a more isolated home with concerns regarding safety.  There does not appear to be substantial awakenings related to childcare at this time as the child is sleeping through the night but he has recognized worsening in sleep continuity.  He does acknowledge using electronic equipment in the bed         SLEEP-WAKE SCHEDULE:      Natural sleep 10-7    Work/School Days: Patient goes to school/work: Yes   Usually gets into bed at 10  Takes patient about 10 min to fall asleep  Has trouble falling asleep 2 nights per week  Wakes up in the middle of the night 2 times.  Wakes up due to Snorting self awake, Anxiety  He has trouble falling back asleep no trouble times a week.   It usually takes 10 min to get back to sleep  Patient is usually up at 6  Uses alarm: No    Weekends/Non-work Days/All Other Days:  Usually gets into bed at 10   Takes patient about 10 min to fall asleep  Patient is usually up at 630 to 7  Uses alarm: No    Sleep Need  Patient gets  8 hr sleep on average   Patient thinks he needs about 6 hr sleep    Dejuan Barron prefers to sleep in this position(s): Side   Patient states they do the following activities in bed: Use phone, computer, or tablet    Naps  Patient takes a purposeful nap 1 times a week and naps are usually 2 to 3hr in duration  He feels better after a nap: Yes  He dozes off unintentionally 0 days per week  Patient has had a driving accident or near-miss due to sleepiness/drowsiness: No      SLEEP DISRUPTIONS:    Breathing/Snoring    Snoring:Yes  Other people complain about his snoring:  No  Others observehe stops breathing in his sleep: No  He has issues with the following: Heartburn or reflux at night    Movement:    Pain, discomfort, with an urge to move:  No  Happens when he is resting:  No  Happens more at night:     Patient has been told he kicks his legs at night:  No     Behaviors in Wakefulness/Sleep:    Dream enactment   No  Sleep eating   No  Bruxism  No                    Dejuan W Anderson has experienced the following behaviors while sleeping: See or hear things that are not really there upon awakening or just falling asleep  He has experienced sudden muscle weakness during the day: No      Is there anything else you would like your sleep provider to know:        CAFFEINE AND OTHER SUBSTANCES:    Patient consumes caffeinated beverages per day:  0  Last caffeine use is usually:    List of any prescribed or over the counter stimulants that patient takes:    List of any prescribed or over the counter sleep medication patient takes:    List of previous sleep medications that patient has tried:    Patient drinks alcohol to help them sleep: No  Patient drinks alcohol near bedtime: No    Family History:  Patient has a family member been diagnosed with a sleep disorder: No                 SCALES:       EPWORTH SLEEPINESS SCALE         10/4/2023     7:46 AM    Munday Sleepiness Scale ( CAROLYN Marrero  [id number]<br>ESS - [place]/English - Final version - 21 Nov 07 - [facility] [place].)   Sitting and reading Would never doze   Watching TV Slight chance of dozing   Sitting, inactive in a public place (e.g. a theatre or a meeting) Would never doze   As a passenger in a car for an hour without a break Slight chance of dozing   Lying down to rest in the afternoon when circumstances permit Moderate chance of dozing   Sitting and talking to someone Would never doze   Sitting quietly after a lunch without alcohol Slight chance of dozing   In a car, while stopped for a few minutes in traffic Would never  doze   [place] Score (MC) 5   [place] Score (Sleep) 5           INSOMNIA SEVERITY INDEX (HERVE)          10/4/2023     7:37 AM   Insomnia Severity Index (HERVE)   Difficulty falling asleep 1   Difficulty staying asleep 3   Problems waking up too early 1   How SATISFIED/DISSATISFIED are you with your CURRENT sleep pattern? 2   How NOTICEABLE to others do you think your sleep problem is in terms of impairing the quality of your life? 1   How WORRIED/DISTRESSED are you about your current sleep problem? 1   To what extent do you consider your sleep problem to INTERFERE with your daily functioning (e.g. daytime fatigue, mood, ability to function at work/daily chores, concentration, memory, mood, etc.) CURRENTLY? 0   HERVE Total Score 9       Guidelines for Scoring/Interpretation:  Total score categories:  0-7 = No clinically significant insomnia   8-14 = Subthreshold insomnia   15-21 = Clinical insomnia (moderate severity)  22-28 = Clinical insomnia (severe)  Used via courtesy of www.[employer].va.gov with permission from Marlon Shaw PhD., [facility]      STOP BANG         10/4/2023     7:52 AM   STOP BANG Questionnaire (  2008, the American Society of Anesthesiologists, Inc[employer]   Neck Cir (cm) Clinic: 41 cm   B/P Clinic: 108/71   BMI Clinic: 30.41           Allergies:    Allergies   Allergen Reactions    Cefaclor Hives and Rash     Happened as an infant             Problem List:     Patient Active Problem List   Diagnosis    Cystic fibrosis carrier            MEDICATIONS:     No current outpatient medications on file.       Problem List:  Patient Active Problem List    Diagnosis Date Noted    Cystic fibrosis carrier 06/11/2023     Priority: Medium        Past Medical/Surgical History:  Past Medical History:   Diagnosis Date    Fracture of great toe, right 07/2023    Varicella      Past Surgical History:   Procedure Laterality Date    ARTHROSCOPIC RECONSTRUCTION ANTERIOR CRUCIATE  LIGAMENT Right 2013    EXTRACTION(S) DENTAL      HERNIA REPAIR, INGUINAL RT/LT Bilateral     infancy    TREATMENT FISTULA ANAL         Social History:  Social History     Socioeconomic History    Marital status:      Spouse name: Not on file    Number of children: Not on file    Years of education: Not on file    Highest education level: Not on file   Occupational History    Not on file   Tobacco Use    Smoking status: Never    Smokeless tobacco: Never   Vaping Use    Vaping Use: Never used   Substance and Sexual Activity    Alcohol use: Yes     Alcohol/week: 2.0 standard drinks of alcohol     Types: 2 Standard drinks or equivalent per week    Drug use: Not Currently    Sexual activity: Not on file   Other Topics Concern    Not on file   Social History Narrative    Updated 6/8/2023: G[place] up [place].  Lives with wife (orthopedic surgeon) and daughter (born around 2022).  Has a dog.  Works as  for [employer].     Social Determinants of Health     Financial Resource Strain: Not on file   Food Insecurity: Not on file   Transportation Needs: Not on file   Physical Activity: Not on file   Stress: Not on file   Social Connections: Not on file   Interpersonal Safety: Not on file   Housing Stability: Not on file       Family History:  Family History   Problem Relation Age of Onset    Alcoholism Mother     Skin Cancer Father         squamous cell and pre-melanoma    Colon Polyps Maternal Grandfather         maybe    Diabetes No family hx of     Glaucoma No family hx of     Macular Degeneration No family hx of     Prostate Cancer No family hx of        Review of Systems:  A complete review of systems reviewed by me is negative with the exeption of what has been mentioned in the history of present illness.  In the last TWO WEEKS have you experienced any of the following symptoms?  Fevers: No  Night Sweats: No  Weight Gain: No  Pain at Night: No  Double Vision: No  Changes in Vision: No  Difficulty Breathing  "through Nose: No  Sore Throat in Morning: No  Dry Mouth in the Morning: Yes  Shortness of Breath Lying Flat: No  Shortness of Breath With Activity: No  Awakening with Shortness of Breath: No  Increased Cough: No  Heart Racing at Night: No  Swelling in Feet or Legs: No  Diarrhea at Night: No  Heartburn at Night: Yes  Urinating More than Once at Night: No  Losing Control of Urine at Night: No  Joint Pains at Night: No  Headaches in Morning: No  Weakness in Arms or Legs: No  Depressed Mood: No  Anxiety: Yes          Physical Examination:     Vitals: /71   Pulse 73   Resp 16   Ht 1.727 m (5' 8\")   Wt 90.7 kg (200 lb)   SpO2 97%   BMI 30.41 kg/m    BMI= Body mass index is 30.41 kg/m .  Mandibular profile  Mallampati Class: II.  Tonsillar Stage: 1  hidden by pillars.  GENERAL: Healthy, alert and no distress  EYES: Eyes grossly normal to inspection.  No discharge or erythema, or obvious scleral/conjunctival abnormalities.  RESP: No audible wheeze, cough, or visible cyanosis.  No visible retractions or increased work of breathing.    SKIN: Visible skin clear. No significant rash, abnormal pigmentation or lesions.  NEURO: Cranial nerves grossly intact.  Mentation and speech appropriate for age.  PSYCH: Mentation appears normal, affect normal/bright, judgement and insight intact, normal speech and appearance well-groomed.    Neck Cir (cm): 41 cm      MENDEZ CORMIER MD 10/4/2023     Total time spent reviewing medical records including previous testing and interpretation as well as direct patient contact and documentation on this date: 60 minutes    "

## 2023-10-04 NOTE — PATIENT INSTRUCTIONS
"          MY TREATMENT INFORMATION FOR SLEEP APNEA-  Dejuan Barron    DOCTOR : MENDEZ CORMIER MD    Am I having a sleep study at a sleep center?  --->Due to normal delays, you will be contacted within 2-4 weeks to schedule    Am I having a home sleep study?  --->Watch the video for the device you are using:    -/drop off device-   https://www.[employer].com/watch?v=yGGFBdELGhk        Frequently asked questions:  1. What is Obstructive Sleep Apnea (LORETA)? LORETA is the most common type of sleep apnea. Apnea means, \"without breath.\"  Apnea is most often caused by narrowing or collapse of the upper airway as muscles relax during sleep.   Almost everyone has occasional apneas. Most people with sleep apnea have had brief interruptions at night frequently for many years.  The severity of sleep apnea is related to how frequent and severe the events are.   2. What are the consequences of LORETA? Symptoms include: feeling sleepy during the day, snoring loudly, gasping or stopping of breathing, trouble sleeping, and occasionally morning headaches or heartburn at night.  Sleepiness can be serious and even increase the risk of falling asleep while driving. Other health consequences may include development of high blood pressure and other cardiovascular disease in persons who are susceptible. Untreated LORETA  can contribute to heart disease, stroke and diabetes.   3. What are the treatment options? In most situations, sleep apnea is a lifelong disease that must be managed with daily therapy. Medications are not effective for sleep apnea and surgery is generally not considered until other therapies have been tried. Your treatment is your choice . Continuous Positive Airway (CPAP) works right away and is the therapy that is effective in nearly everyone. An oral device to hold your jaw forward is usually the next most reliable option. Other options include postioning devices (to keep you off your back), weight loss, and surgery " including a tongue pacing device. There is more detail about some of these options below.  4. Are my sleep studies covered by insurance? Although we will request verification of coverage, we advise you also check in advance of the study to ensure there is coverage.    Important tips for those choosing CPAP and similar devices  For new devices, sign up for device ALIYAH to monitor your device for your followup visits  We encourage you to utilize the TabUp aliyah or website (myAir web (resmed.com) ) to monitor your therapy progress and share the data with your healthcare team when you discuss your sleep apnea.                                                    Know your equipment:  CPAP is continuous positive airway pressure that prevents obstructive sleep apnea by keeping the throat from collapsing while you are sleeping. In most cases, the device is  smart  and can slowly self-adjusts if your throat collapses and keeps a record every day of how well you are treated-this information is available to you and your care team.  BPAP is bilevel positive airway pressure that keeps your throat open and also assists each breath with a pressure boost to maintain adequate breathing.  Special kinds of BPAP are used in patients who have inadequate breathing from lung or heart disease. In most cases, the device is  smart  and can slowly self-adjusts to assist breathing. Like CPAP, the device keeps a record of how well you are treated.  Your mask is your connection to the device. You get to choose what feels most comfortable and the staff will help to make sure if fits. Here: are some examples of the different masks that are available:       Key points to remember on your journey with sleep apnea:  Sleep study.  PAP devices often need to be adjusted during a sleep study to show that they are effective and adjusted right.  Good tips to remember: Try wearing just the mask during a quiet time during the day so your body adapts to  wearing it. A humidifier is recommended for comfort in most cases to prevent drying of your nose and throat. Allergy medication from your provider may help you if you are having nasal congestion.  Getting settled-in. It takes more than one night for most of us to get used to wearing a mask. Try wearing just the mask during a quiet time during the day so your body adapts to wearing it. A humidifier is recommended for comfort in most cases. Our team will work with you carefully on the first day and will be in contact within 4 days and again at 2 and 4 weeks for advice and remote device adjustments. Your therapy is evaluated by the device each day.   Use it every night. The more you are able to sleep naturally for 7-8 hours, the more likely you will have good sleep and to prevent health risks or symptoms from sleep apnea. Even if you use it 4 hours it helps. Occasionally all of us are unable to use a medical therapy, in sleep apnea, it is not dangerous to miss one night.   Communicate. Call our skilled team on the number provided on the first day if your visit for problems that make it difficult to wear the device. Over 2 out of 3 patients can learn to wear the device long-term with help from our team. Remember to call our team or your sleep providers if you are unable to wear the device as we may have other solutions for those who cannot adapt to mask CPAP therapy. It is recommended that you sleep your sleep provider within the first 3 months and yearly after that if you are not having problems.   Use it for your health. We encourage use of CPAP masks during daytime quiet periods to allow your face and brain to adapt to the sensation of CPAP so that it will be a more natural sensation to awaken to at night or during naps. This can be very useful during the first few weeks or months of adapting to CPAP though it does not help medically to wear CPAP during wakefulness and  should not be used as a strategy just to meet  guidelines.  Take care of your equipment. Make sure you clean your mask and tubing using directions every day and that your filter and mask are replaced as recommended or if they are not working.     BESIDES CPAP, WHAT OTHER THERAPIES ARE THERE?    Positioning Device  Positioning devices are generally used when sleep apnea is mild and only occurs on your back.This example shows a pillow that straps around the waist. It may be appropriate for those whose sleep study shows milder sleep apnea that occurs primarily when lying flat on one's back. Preliminary studies have shown benefit but effectiveness at home may need to be verified by a home sleep test. These devices are generally not covered by medical insurance.  Examples of devices that maintain sleeping on the back to prevent snoring and mild sleep apnea.    Belt type body positioner  http://RE2.Netero/    Electronic reminder  http://nightshifttherapy.com/            Oral Appliance  What is oral appliance therapy?  An oral appliance device fits on your teeth at night like a retainer used after having braces. The device is made by a specialized dentist and requires several visits over 1-2 months before a manufactured device is made to fit your teeth and is adjusted to prevent your sleep apnea. Once an oral device is working properly, snoring should be improved. A home sleep test may be recommended at that time if to determine whether the sleep apnea is adequately treated.       [facility]  Search engine: https://mms.aadsm.org/members/directory/search_bootstrap.php?org_id=ADSM&   Certified in Dental Sleep Medicine    Benny Cooper  Degree: EVARISTO  [address]  [address]  [place] [address]  Professional Phone: [phone]  Website: [email or web address].[email or web address]    Jhonatan Srivastava  Degree: EVARISTO  [facility]  [address]  [address]  [place] [address]  Professional Phone: [phone]: [phone]    Yoko Howard  [facility]  [address]  [place] [address]  Professional Phone: [phone]  Website: [email or web address].[email or web address] [email or web address]      Messi Sorenson  Degree: DDS  [address]  [address]  [place] [address]  Professional Phone: [phone]  Fax: [phone]    Hussain Silva  Degree: DDS  [facility] [place]  [address]  [address]  [place] [address]  Professional Phone: [phone]  Website: [email or web address]      De La Paz Derian  [facility]-you should verify insurance coverage  [facility] [facility]  [address]  [place] [address]  Professional Phone: [phone]  Website: [email or web address]      Annalee Nicole--DOES NOT ACCEPT INSURANCE  Degree: DDS--you should verify insurance coverage  [facility]  [facility] [facility]  [address]  [place] [address]  Professional Phone: [phone]     Laura Us  Degree: DDS, PhD  [facility]  [address] [place] [address] [id number] [place] [address],   [address]   [place] [address]   Appointments: [phone]   Fax: [phone]   [facility]   [address]   [address]   [place] [address]   Appointments: [phone]   Fax: [phone]                Rudolph Arshad  Degree: DDS  [address] [place] [address]  Professional Phone: [phone]  Fax: [phone]  Website: [email or web address]      Jasper Grier  Degree: EVARISTO  [facility]  [address] [place] [address]    Mariona Mulet Pradera  Degree: EVARISTO MS Silva TMD, Oral Medicine, [facility]  [address] [place] [address]  Professional Phone: [phone]      Kamille Watkins  Degree: DDS, MS  [facility]  [address]  [address]  [place] [address]  Professional Phone: [phone]    Crystalsukhi Stevens  Degree: DDS  [facility]  [address]  [address]  [place] [address]  [place] Office     Edialfred Rene  Degree: MARIAAS  [facility]  [address] [place] [address]  Professional Phone: [phone]  Website: [email or web address]      Sal Rodriguezfreedom  Degree: MARIAAS  [facility]  [address] [facility] [place] [address]  Professional Phone: [phone]  Fax: [phone]      Aaron Torres  Degree: EVARISTO [facility]  [address]  [address]  [place] [address]    Waqas Amaro   Degree: EVARISTO   [facility]   [address]   [address]  [place] [address]  Phone [phone]  Website: [email or web address]                 ACCEPT MEDICARE  Renny Bentley DDS  [address] [place] [address], [address], [place] [address]  [phone]; [phone] (fax)  [place]    Ayad Riddle DDS, MS   [facility]   [address]   [address]   [place] [address]   Appointments: [phone]   Fax: [phone]     Rashel Arshad DDS   [address]   [place] [address]  Appointments [phone]      ADDITIONAL PROVIDERS    Hussain Alejandra DDS   [facility]   [address]   [address]   [place] [address]   Appointments: [phone]   Fax: [phone]       Jacob Llanes DDS, MS   [facility]  [address] [place] [address] [address]  [place] [address]   Appointments: [phone] Ext: [phone]  Fax: [phone]   [email or web address]    Some things to remember:  -Oral devices are often, but not always, covered by your medical insurance. Be sure to check with your insurance provider.   -If you are referred for oral therapy, you will be given a list of specialized dentists to consider or you may choose to visit the Web site of the American Academy of Dental  Sleep Medicine  -Oral devices are less likely to work if you have severe sleep apnea or are extremely overweight.     More detailed information  An oral appliance is a small acrylic device that fits over the upper and lower teeth  (similar to a retainer or a mouth guard). This device slightly moves jaw forward, which moves the base of the tongue forward, opens the airway, improves breathing for effective treat snoring and obstructive sleep apnea in perhaps 7 out of 10 people .  The best working devices are custom-made by a dental device  after a mold is made of the teeth 1, 2, 3.  When is an oral appliance indicated?  Oral appliance therapy is recommended as a first-line treatment for patients with primary snoring, mild sleep apnea, and for patients with moderate sleep apnea who prefer appliance therapy to use of CPAP4, 5. Severity of sleep apnea is determined by sleep testing and is based on the number of respiratory events per hour of sleep.   How successful is oral appliance therapy?  The success rate of oral appliance therapy in patients with mild sleep apnea is 75-80% while in patients with moderate sleep apnea it is 50-70%. The chance of success in patients with severe sleep apnea is 40-50%. The research also shows that oral appliances have a beneficial effect on the cardiovascular health of LORETA patients at the same magnitude as CPAP therapy7.  Oral appliances should be a second-line treatment in cases of severe sleep apnea, but if not completely successful then a combination therapy utilizing CPAP plus oral appliance therapy may be effective. Oral appliances tend to be effective in a broad range of patients although studies show that the patients who have the highest success are females, younger patients, those with milder disease, and less severe obesity. 3, 6.   Finding a dentist that practices dental sleep medicine  Specific training is available through the American Academy of Dental Sleep  Medicine for dentists interested in working in the field of sleep. To find a dentist who is educated in the field of sleep and the use of oral appliances, near you, visit the Web site of the American Academy of Dental Sleep Medicine.    References  1. Sha et al. Objectively measured vs self-reported compliance during oral appliance therapy for sleep-disordered breathing. Chest 2013; 144(5): 3495-5279.  2. Noelle et al. Objective measurement of compliance during oral appliance therapy for sleep-disordered breathing. Thorax 2013; 68(1): 91-96.  3. Stefanie et al. Mandibular advancement devices in 620 men and women with LORETA and snoring: tolerability and predictors of treatment success. Chest 2004; 125: 9294-9106.  4. Carlton et al. Oral appliances for snoring and LORETA: a review. Sleep 2006; 29: 244-262.  5. Marcus et al. Oral appliance treatment for LORETA: an update. J Clin Sleep Med 2014; 10(2): 215-227.  6. Christa et al. Predictors of OSAH treatment outcome. J Dent Res 2007; 86: 8593-9250.      Weight Loss:    Weight loss is a long-term strategy that may improve sleep apnea in some patients.    Weight management is a personal decision and the decision should be based on your interest and the potential benefits.  If you are interested in exploring weight loss strategies, the following discussion covers the impact on weight loss on sleep apnea and the approaches that may be successful.    Being overweight does not necessarily mean you will have health consequences.  Those who have BMI over 35 or over 27 with existing medical conditions carries greater risk.   Weight loss decreases severity of sleep apnea in most people with obesity. For those with mild obesity who have developed snoring with weight gain, even 15-30 pound weight loss can improve and occasionally eliminate sleep apnea.  Structured and life-long dietary and health habits are necessary to lose weight and keep healthier weight levels.      Though there may be significant health benefits from weight loss, long-term weight loss is very difficult to achieve- studies show success with dietary management in less than 10% of people. In addition, substantial weight loss may require years of dietary control and may be difficult if patients have severe obesity. In these cases, surgical management may be considered.  Finally, older individuals who have tolerated obesity without health complications may be less likely to benefit from weight loss strategies.      [unfilled]    Surgery:    Surgery for obstructive sleep apnea is considered generally only when other therapies fail to work. Surgery may be discussed with you if you are having a difficult time tolerating CPAP and or when there is an abnormal structure that requires surgical correction.  Nose and throat surgeries often enlarge the airway to prevent collapse.  Most of these surgeries create pain for 1-2 weeks and up to half of the most common surgeries are not effective throughout life.  You should carefully discuss the benefits and drawbacks to surgery with your sleep provider and surgeon to determine if it is the best solution for you.   More information  Surgery for LORETA is directed at areas that are responsible for narrowing or complete obstruction of the airway during sleep.  There are a wide range of procedures available to enlarge and/or stabilize the airway to prevent blockage of breathing in the three major areas where it can occur: the palate, tongue, and nasal regions.  Successful surgical treatment depends on the accurate identification of the factors responsible for obstructive sleep apnea in each person.  A personalized approach is required because there is no single treatment that works well for everyone.  Because of anatomic variation, consultation with an examination by a sleep surgeon is a critical first step in determining what surgical options are best for each patient.  In some  cases, examination during sedation may be recommended in order to guide the selection of procedures.  Patients will be counseled about risks and benefits as well as the typical recovery course after surgery. Surgery is typically not a cure for a person s LORETA.  However, surgery will often significantly improve one s LORETA severity (termed  success rate ).  Even in the absence of a cure, surgery will decrease the cardiovascular risk associated with OSA7; improve overall quality of life8 (sleepiness, functionality, sleep quality, etc).      Palate Procedures:  Patients with LORETA often have narrowing of their airway in the region of their tonsils and uvula.  The goals of palate procedures are to widen the airway in this region as well as to help the tissues resist collapse.  Modern palate procedure techniques focus on tissue conservation and soft tissue rearrangement, rather than tissue removal.  Often the uvula is preserved in this procedure. Residual sleep apnea is common in patient after pharyngoplasty with an average reduction in sleep apnea events of 33%2.      Tongue Procedures:  ExamWhile patients are awake, the muscles that surround the throat are active and keep this region open for breathing. These muscles relax during sleep, allowing the tongue and other structures to collapse and block breathing.  There are several different tongue procedures available.  Selection of a tongue base procedure depends on characteristics seen on physical exam.  Generally, procedures are aimed at removing bulky tissues in this area or preventing the back of the tongue from falling back during sleep.  Success rates for tongue surgery range from 50-62%3.    Hypoglossal Nerve Stimulation:  Hypoglossal nerve stimulation has recently received approval from the [place] Food and Drug Administration for the treatment of obstructive sleep apnea.  This is based on research showing that the system was safe and effective in treating sleep  apnea6.  Results showed that the median AHI score decreased 68%, from 29.3 to 9.0. This therapy uses an implant system that senses breathing patterns and delivers mild stimulation to airway muscles, which keeps the airway open during sleep.  The system consists of three fully implanted components: a small generator (similar in size to a pacemaker), a breathing sensor, and a stimulation lead.  Using a small handheld remote, a patient turns the therapy on before bed and off upon awakening.    Candidates for this device must be greater than 18 years of age, have moderate to severe LORETA (AHI between 15-65), BMI less than 35, have tried CPAP/oral appliance for at least 8 weeks without success, and have appropriate upper airway anatomy (determined by a sleep endoscopy performed by Dr. Stephen Olivares).    Hypoglossal Nerve Stimulation Pathway:    The sleep surgeon s office will work with the patient through the insurance prior-authorization process (including communications and appeals).    Nasal Procedures:  Nasal obstruction can interfere with nasal breathing during the day and night.  Studies have shown that relief of nasal obstruction can improve the ability of some patients to tolerate positive airway pressure therapy for obstructive sleep apnea1.  Treatment options include medications such as nasal saline, topical corticosteroid and antihistamine sprays, and oral medications such as antihistamines or decongestants. Non-surgical treatments can include external nasal dilators for selected patients. If these are not successful by themselves, surgery can improve the nasal airway either alone or in combination with these other options.      Combination Procedures:  Combination of surgical procedures and other treatments may be recommended, particularly if patients have more than one area of narrowing or persistent positional disease.  The success rate of combination surgery ranges from 66-80%2,3.    References  Priyank SALOMON  The Role of the Nose in Snoring and Obstructive Sleep Apnoea: An Update.  Eur Arch Otorhinolaryngol. 2011; 268: 1365-73.   Maty SM; Meredith JA; Corina JR; Pallanch JF; Harjinder MB; Tessa SG; Vic REED. Surgical modifications of the upper airway for obstructive sleep apnea in adults: a systematic review and meta-analysis. SLEEP 2010;33(10):6521-0209. Minda JOHANSEN. Hypopharyngeal surgery in obstructive sleep apnea: an evidence-based medicine review.  Arch Otolaryngol Head Neck Surg. 2006 Feb;132(2):206-13.  Demario YH1, Caren Y, Cesario LISANDRO. The efficacy of anatomically based multilevel surgery for obstructive sleep apnea. Otolaryngol Head Neck Surg. 2003 Oct;129(4):327-35.  Kezirian E, Goldberg A. Hypopharyngeal Surgery in Obstructive Sleep Apnea: An Evidence-Based Medicine Review. Arch Otolaryngol Head Neck Surg. 2006 Feb;132(2):206-13.  Nicolasa MCCARTHY et al. Upper-Airway Stimulation for Obstructive Sleep Apnea.  N Engl J Med. 2014 Jan 9;370(2):139-49.  Pezak Y et al. Increased Incidence of Cardiovascular Disease in Middle-aged Men with Obstructive Sleep Apnea. Am J Respir Crit Care Med; 2002 166: 159-165  Zamora EM et al. Studying Life Effects and Effectiveness of Palatopharyngoplasty (SLEEP) study: Subjective Outcomes of Isolated Uvulopalatopharyngoplasty. Otolaryngol Head Neck Surg. 2011; 144: 623-631.        WHAT IF I ONLY HAVE SNORING?    Mandibular advancement devices, lateral sleep positioning, long-term weight loss and treatment of nasal allergies have been shown to improve snoring.  Exercising tongue muscles with a game (https://apps.ePod Solar.ProtÃ©gÃ© [employer]ical/us/aliyah/soundly-reduce-snoring/vr2844770483) or stimulating the tongue during the day with a device (https://doi.org/10.3390/cns69989754) have improved snoring in some individuals.    Remember to Drive Safe... Drive Alive     Sleep health profoundly affects your health, mood, and your safety.  Thirty three percent of the population (one in three of us) is not getting enough sleep  and many have a sleep disorder. Not getting enough sleep or having an untreated / undertreated sleep condition may make us sleepy without even knowing it. In fact, our driving could be dramatically impaired due to our sleep health. As your provider, here are some things I would like you to know about driving:     Here are some warning signs for impairment and dangerous drowsy driving:              -Having been awake more than 16 hours               -Looking tired               -Eyelid drooping              -Head nodding (it could be too late at this point)              -Driving for more than 30 minutes     Some things you could do to make the driving safer if you are experiencing some drowsiness:              -Stop driving and rest              -Call for transportation              -Make sure your sleep disorder is adequately treated     Some things that have been shown NOT to work when experiencing drowsiness while driving:              -Turning on the radio              -Opening windows              -Eating any  distracting  /  entertaining  foods (e.g., sunflower seeds, candy, or any other)              -Talking on the phone      Your decision may not only impact your life, but also the life of others. Please, remember to drive safe for yourself and all of us.                      CBT-I Introductory Module    Understanding Sleep and Insomnia    Most people have trouble sleeping at some point in their life.   Chronic insomnia means you have had trouble falling asleep and/or staying asleep for at least the past three months.  Despite allowing enough time for sleep, it is affecting how you feel. You are not alone.  It is estimated that 10-15% of adults experience chronic insomnia.     Cognitive Behavioral Therapy for Insomnia (CBT-I)    Consistent with the guidelines of the American College of Physicians, [employer] recommends  Cognitive Behavioral Therapy for Insomnia (CBT-I) as the first-line treatment for  insomnia.  CBT-I targets factors that lead to long-term insomnia:    Behavioral Conditioning    When you lay awake in bed over many nights, your body actually becomes trained or 'conditioned' to be awake during the night.  It makes the bed associated with alertness instead of sleepiness.    Habits that weaken your body's sleep drive and circadian sleep rhythm    Changing sleep schedules, extended time in bed, using mobile devices and computers close to bedtime, and naps too late in the day can harm your sleep at night.    Emotional and Physical Arousal    Things such as worrying about sleep, stress, drinking too much caffeine, and not winding down before bed can interfere with your body's natural sleep drive.    Understanding Sleep and Insomnia    [facility] CBT-I is a four-part program that teaches you the skills needed for a better night's sleep. The first step in your program is learning a bit about sleep and insomnia.      The Basics of Sleep    How does sleep help us?    Sleep, like food and water, is something we need every day. The purpose of sleep is still not exactly clear, but sleep experts agree we need consistent quality sleep to function at our best. There is evidence that sleep helps maintain brain and body functions.  It helps maintain thinking ability and mood.  Sleep is actually a very active part of life. Sleep occurs in four stages that cycle every  minutes throughout the night. We get our deepest sleep during the first few hours of sleep.  During the last half of our sleep, we usually get the bulk of our REM (Rapid Eye Movement) sleep.  REM sleep is when most of our dreaming occurs.    How much sleep do we need?    Sleep needs vary from person to person. Most adults need between 6-8 hours of sleep.  Sleeping too little or too much may be a health risk.  As we age, most people report their sleep gets lighter, earlier, shorter, and more restless.     What Controls Our Sleep?    The  three things that regulate your sleep are your sleep drive, biological clock and your arousal system (emotional and physical).  Together these make us feel alert during the day and promote sleep at night.    Your sleep drive depends on how long you have been awake. It is lowest when you first wake up.  Sleep drive gradually increases as the day goes on.  The longer time you are awake the easier it is to fall asleep.  Sleeping gradually reduces your sleep drive. That is why napping in the evening or close to bed can make it harder to sleep at night.    Your biological clock promotes wakefulness during the day and sleep at night.          From Kristi et al. (2009). Evaluation and Management of Insomnia in the Psychiatric setting. Published Online: 19/1/2009; DOI: https://doi.org/10.1176/foc.7.4.dmk787TV      Understanding Insomnia    What causes insomnia?    Some people have a greater predisposition to developing insomnia due to genetics, personality or age. A host of things can trigger or precipitate it: jet lag, working a different shift, medication, or the onset of a medical or mental health condition.         Insomnia and Your Arousal System    Mental activity, emotions and physical symptoms can make your brain too active to sleep by masking the strength of your sleep drive. Your brain's arousal system not only triggers insomnia, it plays a role in maintaining it as well.  Common sources of arousal include:    Worry about sleep in bed  An active mind concerned about unfinished tasks  Anxiety, Stress and Depression  Pain     What perpetuates insomnia?    Short-term insomnia can become chronic when you begin to feel fearful, worried and  on guard  about sleep loss. As you spend more time in bed or try forcing yourself to sleep your bed becomes linked with wakefulness.  This is why people with insomnia commonly report feeling tired before bed but suddenly more alert when getting into bed.  This type of   conditioning  along with unhelpful sleep habits maintains the insomnia even when the triggering event has resolved.    Tracking your Sleep    Sleep tracking is an essential part of training yourself to sleep better and monitoring your progress.  There are several ways to keep track of your sleep habits.     Insomnia  Kiersten    The Insomnia  kiersten is a convenient way to keep track of your sleep prior to and during treatment.  Simply download the free kiersten on your Apple or Android phone and record your information each morning.   The data you enter should be your recollection of the past nigh of sleep. Do not watch or monitor the clock in the middle of the night while keeping your sleep diary.     The kiersten also includes training and sleep schedule recommendations.  We recommend you use only the tracking function unless instructed by your provider. You can email your data to yourself prior to your visit by using the [facility] User Data function found in the Settings Section.  It is important that you have your data available to review with your provider at the time of your visit.             Flavourly Sleep Diary    You can also track your sleep using the Flavourly paper sleep diary.  You can upload your sleep diary and send it via a "[employer] message  or have it with you at the time of your visit.        Mobile and Wearable Sleep Tracking Devices    There are many sleep tracking devices and mobile applications that estimate the timing and quantity of sleep by measuring body movement.  Though many of these devices claim to measure the depth or stages of sleep, they cannot measure brain wave activity or other indicators required to determine the actual stages of sleep.  They are helpful in estimating the timing and total amount of time you sleep.    CBT-I and Sleeping Pills    Sleep medications can be helpful in the short-term but often stop working in the long-term.  Sleeping pills treat symptoms and not  the underlying cause of insomnia.  They also can have side effects that last well into the day. Abruptly stopping sleeping pills can cause temporary rebound insomnia and lead to increased distress about sleeplessness.  This in turn strengthens the belief that pills are necessary for sleep.    Many patients choose to discontinue sleeping pills prior to beginning CBT-I.  You should talk to your prescribing provider before tapering or discontinuing sleep medication.  c     Your BMI is Body mass index is 30.41 kg/m .    What is BMI?  Body mass index (BMI) is one way to tell whether you are at a healthy weight, overweight, or obese. It measures your weight in relation to your height.  A BMI of 18.5 to 24.9 is in the healthy range. A person with a BMI of 25 to 29.9 is considered overweight, and someone with a BMI of 30 or greater is considered obese.  Another way to find out if you are at risk for health problems caused by overweight and obesity is to measure your waist. If you are a woman and your waist is more than 35 inches, or if you are a man and your waist is more than 40 inches, your risk of disease may be higher.  More than two-thirds of American adults are considered overweight or obese. Being overweight or obese increases the risk for further weight gain.  Excess weight may lead to heart disease and diabetes. Creating and following plans for healthy eating and physical activity may help you improve your health.    Methods for maintaining or losing weight.  Weight control is part of healthy lifestyle and includes exercise, emotional health, and healthy eating habits.  Careful eating habits lifelong is the mainstay of weight control.  Though there are significant health benefits from weight loss, long-term weight loss with diet alone may be very difficult to achieve- studies show long-term success with dietary management in less than 10% of people. Attaining a healthy weight may be especially difficult to achieve  in those with severe obesity. In some cases, medications, devices and surgical management might be considered.    What can you do?  If you are overweight or obese and are interested in methods for weight loss, you should discuss this with your provider. In addition, we recommend that you review healthy life styles and methods for weight loss available through the National Institutes of Health patient information sites:   http://win.niddk.nih.gov/publications/index.htm

## 2023-10-26 ENCOUNTER — VIRTUAL VISIT (OUTPATIENT)
Dept: ORTHOPEDICS | Facility: CLINIC | Age: 38
End: 2023-10-26
Payer: COMMERCIAL

## 2023-10-26 DIAGNOSIS — Z98.890 H/O ANTERIOR CRUCIATE LIGAMENT SURGERY: Primary | ICD-10-CM

## 2023-10-26 PROCEDURE — 99213 OFFICE O/P EST LOW 20 MIN: CPT | Mod: 93 | Performed by: FAMILY MEDICINE

## 2023-10-26 NOTE — PROGRESS NOTES
Dejuan is a 38 year old who is being evaluated via a billable telephone visit.      What phone number would you like to be contacted at: [phone]  How would you like to obtain your AVS? Beena    Distant Location (provider location):  On-site  Phone call duration: 5 minutes      Phone fup right knee injury    We agreed to have a phone call follow-up today regarding his right knee.      Patient indicates that his knee does not feel unstable, he does not notice mechanical symptoms.  He feels like he has been able to get back to a squat program and he tolerates it.  He notes residual stiffness at his great toe. He was offered physical therapy for range of motion for his great toe, declined for now.  He will notify me if he wants to see physical therapy.  He had no further questions.         O:  GENERAL: healthy, alert, without distress  RESP: No audible wheeze, cough.  No increased work of breathing.    NEURO:  Mentation and speech appropriate for age.  PSYCH: mentation appears normal, concentration appears appropriate, judgement and insight intact.    PMH:  Past Medical History:   Diagnosis Date    Fracture of great toe, right 07/2023    Varicella        Active problem list:  Patient Active Problem List   Diagnosis    Cystic fibrosis carrier       FH:  Family History   Problem Relation Age of Onset    Alcoholism Mother     Skin Cancer Father         squamous cell and pre-melanoma    Colon Polyps Maternal Grandfather         maybe    Diabetes No family hx of     Glaucoma No family hx of     Macular Degeneration No family hx of     Prostate Cancer No family hx of        SH:  Social History     Socioeconomic History    Marital status:      Spouse name: Not on file    Number of children: Not on file    Years of education: Not on file    Highest education level: Not on file   Occupational History    Not on file   Tobacco Use    Smoking status: Never    Smokeless tobacco: Never   Vaping Use    Vaping Use: Never  used   Substance and Sexual Activity    Alcohol use: Yes     Alcohol/week: 2.0 standard drinks of alcohol     Types: 2 Standard drinks or equivalent per week    Drug use: Not Currently    Sexual activity: Not on file   Other Topics Concern    Not on file   Social History Narrative    Updated 6/8/2023: Grew up in [place].  Lives with wife (orthopedic surgeon) and daughter (born around 2022).  Has a dog.  Works as  for [employer].     Social Determinants of Health     Financial Resource Strain: Not on file   Food Insecurity: Not on file   Transportation Needs: Not on file   Physical Activity: Not on file   Stress: Not on file   Social Connections: Not on file   Interpersonal Safety: Not on file   Housing Stability: Not on file       MEDS:  See EMR, reviewed  ALL:  See EMR, reviewed    REVIEW OF SYSTEMS:  CONSTITUTIONAL:NEGATIVE for fever, chills, change in weight  INTEGUMENTARY/SKIN: NEGATIVE for worrisome rashes, moles or lesions  EYES: NEGATIVE for vision changes or irritation  ENT/MOUTH: NEGATIVE for ear, mouth and throat problems  RESP:NEGATIVE for significant cough or SOB  BREAST: NEGATIVE for masses, tenderness or discharge  CV: NEGATIVE for chest pain, palpitations or peripheral edema  GI: NEGATIVE for nausea, abdominal pain, heartburn, or change in bowel habits  :NEGATIVE for frequency, dysuria, or hematuria  :NEGATIVE for frequency, dysuria, or hematuria  NEURO: NEGATIVE for weakness, dizziness or paresthesias  ENDOCRINE: NEGATIVE for temperature intolerance, skin/hair changes  HEME/ALLERGY/IMMUNE: NEGATIVE for bleeding problems  PSYCHIATRIC: NEGATIVE for changes in mood or affect

## 2023-10-26 NOTE — LETTER
10/26/2023       RE: Dejuan Barron  [address]  [facility] [address]     Dear Colleague,    Thank you for referring your patient, Dejuan Barron, to the [facility] [place] [facility]. Please see a copy of my visit note below.        Phone fup right knee injury    We agreed to have a phone call follow-up today regarding his right knee.      Patient indicates that his knee does not feel unstable, he does not notice mechanical symptoms.  He feels like he has been able to get back to a squat program and he tolerates it.  He notes residual stiffness at his great toe. He was offered physical therapy for range of motion for his great toe, declined for now.  He will notify me if he wants to see physical therapy.  He had no further questions.         O:  GENERAL: healthy, alert, without distress  RESP: No audible wheeze, cough.  No increased work of breathing.    NEURO:  Mentation and speech appropriate for age.  PSYCH: mentation appears normal, concentration appears appropriate, judgement and insight intact.    PMH:  Past Medical History:   Diagnosis Date    Fracture of great toe, right 07/2023    Varicella        Active problem list:  Patient Active Problem List   Diagnosis    Cystic fibrosis carrier       FH:  Family History   Problem Relation Age of Onset    Alcoholism Mother     Skin Cancer Father         squamous cell and pre-melanoma    Colon Polyps Maternal Grandfather         maybe    Diabetes No family hx of     Glaucoma No family hx of     Macular Degeneration No family hx of     Prostate Cancer No family hx of        SH:  Social History     Socioeconomic History    Marital status:      Spouse name: Not on file    Number of children: Not on file    Years of education: Not on file    Highest education level: Not on file   Occupational History    Not on file   Tobacco Use    Smoking status: Never    Smokeless tobacco:  Never   Vaping Use    Vaping Use: Never used   Substance and Sexual Activity    Alcohol use: Yes     Alcohol/week: 2.0 standard drinks of alcohol     Types: 2 Standard drinks or equivalent per week    Drug use: Not Currently    Sexual activity: Not on file   Other Topics Concern    Not on file   Social History Narrative    Updated 6/8/2023: Grew up in [place].  Lives with wife (orthopedic surgeon) and daughter (born around 2022).  Has a dog.  Works as  for [employer].     Social Determinants of Health     Financial Resource Strain: Not on file   Food Insecurity: Not on file   Transportation Needs: Not on file   Physical Activity: Not on file   Stress: Not on file   Social Connections: Not on file   Interpersonal Safety: Not on file   Housing Stability: Not on file       MEDS:  See EMR, reviewed  ALL:  See EMR, reviewed    REVIEW OF SYSTEMS:  CONSTITUTIONAL:NEGATIVE for fever, chills, change in weight  INTEGUMENTARY/SKIN: NEGATIVE for worrisome rashes, moles or lesions  EYES: NEGATIVE for vision changes or irritation  ENT/MOUTH: NEGATIVE for ear, mouth and throat problems  RESP:NEGATIVE for significant cough or SOB  BREAST: NEGATIVE for masses, tenderness or discharge  CV: NEGATIVE for chest pain, palpitations or peripheral edema  GI: NEGATIVE for nausea, abdominal pain, heartburn, or change in bowel habits  :NEGATIVE for frequency, dysuria, or hematuria  :NEGATIVE for frequency, dysuria, or hematuria  NEURO: NEGATIVE for weakness, dizziness or paresthesias  ENDOCRINE: NEGATIVE for temperature intolerance, skin/hair changes  HEME/ALLERGY/IMMUNE: NEGATIVE for bleeding problems  PSYCHIATRIC: NEGATIVE for changes in mood or affect        Again, thank you for allowing me to participate in the care of your patient.      Sincerely,    Osmani Rodriguez MD

## 2023-12-11 ASSESSMENT — SLEEP AND FATIGUE QUESTIONNAIRES
HOW LIKELY ARE YOU TO NOD OFF OR FALL ASLEEP WHILE SITTING AND READING: SLIGHT CHANCE OF DOZING
HOW LIKELY ARE YOU TO NOD OFF OR FALL ASLEEP IN A CAR, WHILE STOPPED FOR A FEW MINUTES IN TRAFFIC: WOULD NEVER DOZE
HOW LIKELY ARE YOU TO NOD OFF OR FALL ASLEEP WHILE LYING DOWN TO REST IN THE AFTERNOON WHEN CIRCUMSTANCES PERMIT: MODERATE CHANCE OF DOZING
HOW LIKELY ARE YOU TO NOD OFF OR FALL ASLEEP WHEN YOU ARE A PASSENGER IN A CAR FOR AN HOUR WITHOUT A BREAK: WOULD NEVER DOZE
HOW LIKELY ARE YOU TO NOD OFF OR FALL ASLEEP WHILE SITTING AND TALKING TO SOMEONE: WOULD NEVER DOZE
HOW LIKELY ARE YOU TO NOD OFF OR FALL ASLEEP WHILE SITTING QUIETLY AFTER LUNCH WITHOUT ALCOHOL: SLIGHT CHANCE OF DOZING
HOW LIKELY ARE YOU TO NOD OFF OR FALL ASLEEP WHILE SITTING INACTIVE IN A PUBLIC PLACE: WOULD NEVER DOZE
HOW LIKELY ARE YOU TO NOD OFF OR FALL ASLEEP WHILE WATCHING TV: WOULD NEVER DOZE

## 2023-12-13 ENCOUNTER — OFFICE VISIT (OUTPATIENT)
Dept: SLEEP MEDICINE | Facility: CLINIC | Age: 38
End: 2023-12-13
Attending: INTERNAL MEDICINE
Payer: COMMERCIAL

## 2023-12-13 DIAGNOSIS — G47.33 OSA (OBSTRUCTIVE SLEEP APNEA): ICD-10-CM

## 2023-12-13 PROCEDURE — G0399 HOME SLEEP TEST/TYPE 3 PORTA: HCPCS | Performed by: INTERNAL MEDICINE

## 2023-12-13 NOTE — PROGRESS NOTES
Pt is completing a home sleep test. Pt was instructed on how to put on the Noxturnal T3 device and associated equipment before going to bed and given the opportunity to practice putting it on before leaving the sleep center. Pt was reminded to bring the home sleep test kit back to the center tomorrow, at agreed upon time for download and reporting.   Neck circumference: 16.1 CM / 41 inches.

## 2023-12-14 ENCOUNTER — DOCUMENTATION ONLY (OUTPATIENT)
Dept: SLEEP MEDICINE | Facility: CLINIC | Age: 38
End: 2023-12-14
Payer: COMMERCIAL

## 2023-12-14 NOTE — PROGRESS NOTES
HST POST-STUDY QUESTIONNAIRE    What time did you go to bed?  10 pm  How long do you think it took to fall asleep?  15 min  What time did you wake up to start the day?  5:15 am  Did you get up during the night at all?  no  If you woke up, do you remember approximately what time(s)? Woke up probably 3 or so times, unsure of times  Did you have any difficulty with the equipment?  No  Did you us any type of treatment with this study?  None  Was the head of the bed elevated? No  Did you sleep in a recliner?  No  Did you stop using CPAP at least 3 days before this test?  NA  Any other information you'd like us to know? no

## 2023-12-19 NOTE — PROGRESS NOTES
This HSAT was performed using a Noxturnal T3 device which recorded snore, sound, movement activity, body position, nasal pressure, oronasal thermal airflow, pulse, oximetry and both chest and abdominal respiratory effort. HSAT data was restricted to the time patient states they were in bed.     HSAT was scored using 1B 4% hypopnea rule.     HST AHI (Non-PAT): 0.4  Snoring was reported as moderate and loud.  Time with SpO2 below 89% was 0 minutes.   Overall signal quality was good     Pt will follow up with sleep provider to determine appropriate therapy.

## 2023-12-27 NOTE — PROCEDURES
"HOME SLEEP STUDY INTERPRETATION        Patient: Dejuan Barron  MRN: [id number]  YOB: 1985  Study Date: 12/13/2023  PCP/Referring Provider: Bigg Tay;   Ordering Provider:   Noam Keane MD         Indications for Home Study: Dejuan Barron is a 38 year old male with a history of anxiety and mid sleep awakenings who presents with symptoms suggestive of obstructive sleep apnea.    Estimated body mass index is 30.41 kg/m  as calculated from the following:    Height as of 10/4/23: 1.727 m (5' 8\").    Weight as of 10/4/23: 90.7 kg (200 lb).  Total score - [place]: 4 (12/11/2023  9:55 AM)  STOP-BANG: 3/8        Data: A full night home sleep study was performed recording the standard physiologic parameters including body position, movement, sound, nasal pressure, thermal oral airflow, chest and abdominal movements with respiratory inductance plethysmography, and oxygen saturation by pulse oximetry. Pulse rate was estimated by oximetry recording. This study was considered adequate based on > 4 hours of quality oximetry and respiratory recording. As specified by the AASM Manual for the Scoring of Sleep and Associated events, version 2.3, Rule VIII.D 1B, 4% oxygen desaturation scoring for hypopneas is used as a standard of care on all home sleep apnea testing.        Analysis Time:  480 minutes        Respiration:   Sleep Associated Hypoxemia: sustained hypoxemia was not present. Baseline oxygen saturation was 94%.  Time with saturation less than or equal to 88% was 0 minutes. The lowest oxygen saturation was 88%.   Snoring: Snoring was present 15% of time at 79 dB.  Respiratory events: The home study revealed a presence of 0 obstructive apneas and 0 mixed and central apneas. There were 3 hypopneas resulting in a combined apnea/hypopnea index [AHI] of 0 events per hour.  AHI was 1 per hour supine, - per hour prone, 0 per hour on left side, and 0 per hour on right side.   Pattern: Excluding events " noted above, respiratory rate and pattern was Normal.      Position: Percent of time spent: supine - 41%, prone - 0%, on left - 43%, on right - 16%.      Heart Rate: By pulse oximetry normal rate was noted.       Assessment:   Snoring without significant obstructive sleep apnea.  Sleep associated hypoxemia was not present.    Recommendations:  If snoring is socially-unacceptable, consider online oral appliance therapy.  Suggest optimizing sleep hygiene and avoiding sleep deprivation.  Long term weight reduction may improve snoring in some individuals        Diagnosis Code(s): Snoring R06.83    Electronically signed by: MENDEZ CORMIER MD, December 27, 2023   Diplomate, American Board of Internal Medicine, Sleep Medicine

## 2024-02-01 ENCOUNTER — VIRTUAL VISIT (OUTPATIENT)
Dept: SLEEP MEDICINE | Facility: CLINIC | Age: 39
End: 2024-02-01
Payer: COMMERCIAL

## 2024-02-01 VITALS — WEIGHT: 195 LBS | BODY MASS INDEX: 28.88 KG/M2 | HEIGHT: 69 IN

## 2024-02-01 DIAGNOSIS — R06.83 SNORING: Primary | ICD-10-CM

## 2024-02-01 PROCEDURE — 99213 OFFICE O/P EST LOW 20 MIN: CPT | Mod: 95

## 2024-02-01 ASSESSMENT — PAIN SCALES - GENERAL: PAINLEVEL: MODERATE PAIN (4)

## 2024-02-01 ASSESSMENT — SLEEP AND FATIGUE QUESTIONNAIRES
HOW LIKELY ARE YOU TO NOD OFF OR FALL ASLEEP WHEN YOU ARE A PASSENGER IN A CAR FOR AN HOUR WITHOUT A BREAK: WOULD NEVER DOZE
HOW LIKELY ARE YOU TO NOD OFF OR FALL ASLEEP WHILE SITTING AND READING: SLIGHT CHANCE OF DOZING
HOW LIKELY ARE YOU TO NOD OFF OR FALL ASLEEP WHILE SITTING QUIETLY AFTER LUNCH WITHOUT ALCOHOL: SLIGHT CHANCE OF DOZING
HOW LIKELY ARE YOU TO NOD OFF OR FALL ASLEEP WHILE LYING DOWN TO REST IN THE AFTERNOON WHEN CIRCUMSTANCES PERMIT: SLIGHT CHANCE OF DOZING
HOW LIKELY ARE YOU TO NOD OFF OR FALL ASLEEP WHILE SITTING AND TALKING TO SOMEONE: WOULD NEVER DOZE
HOW LIKELY ARE YOU TO NOD OFF OR FALL ASLEEP WHILE SITTING INACTIVE IN A PUBLIC PLACE: WOULD NEVER DOZE
HOW LIKELY ARE YOU TO NOD OFF OR FALL ASLEEP IN A CAR, WHILE STOPPED FOR A FEW MINUTES IN TRAFFIC: WOULD NEVER DOZE
HOW LIKELY ARE YOU TO NOD OFF OR FALL ASLEEP WHILE WATCHING TV: WOULD NEVER DOZE

## 2024-02-01 NOTE — PATIENT INSTRUCTIONS
[facility]  Search engine: https://mms.aadsm.org/members/directory/search_bootstrap.php?org_id=ADSM&   Certified in Dental Sleep Medicine    Benny Cooper  Degree: DDS  [address]  [address]  [place] [address]  Professional Phone: [phone]  Website: [email or web address]    Jhonatan Srivastava  Degree: DDS  [facility]  [address]  [address]  [place] [address]  Professional Phone: [phone] Fax: [phone]    Yoko Howard  [facility]  [address]  [place] [address]  Professional Phone: [phone]  Website: [email or web address]      Messi Sorenson  Degree: DDS  [address]  [address]  [place] [address]  Professional Phone: [phone]  Fax: [phone]    Hussain Silva  Degree: DDS  [facility]  [address]  [address]  [place] [address]  Professional Phone: [phone]  Website: [email or web address]      [facility]  [facility]  [address]  [address]  [place] [address]  Professional Phone: [phone]  Website: [email or web address]      Annalee Nicole  Degree: DDS  [facility]  [address]  [address]  [place] [address]  Professional Phone: [phone]     Laura Us  Degree: DDS, PhD  [facility]  [address] [place] [address] [address] [place] [address]   [address],   [address]   [place] [address]   Appointments: [phone]   Fax: [phone]   [facility]   [address]   [address]   [place] [address]   Appointments: [phone]   Fax: [phone]                Rudolph Arshad  Degree: DDS  [address] [place] [address]  Professional Phone: [phone]  Fax: [phone]  Website: [email or web address]      Jasperberhane Grier  Degree: EVARISTO  [facility]  [address] [place] [address]    Genesisona Mulet Pradera  Degree: MS EVARISTO  [facility]  [address] [place] [address]  Professional Phone: [phone]      Kamille Watkins  Degree: MS EVARISTO  [facility]  [address]  [address]  [place] [address]  Professional Phone: [phone]    Crystal Stevens  Degree: EVARISTO  [facility]  [address]  [address]  [place] [address]  [place] Office     Edi Rene  Degree: EVARISTO  [facility]  [address] [place] [address]  Professional Phone: [phone]  Website: [email or web address]      Sal Dixon  Degree: EVARISTO  [facility]  [address] [place] [address]  Professional Phone: [phone]  Fax: [phone]      Aaron Torres  Degree: EVARISTO [facility]  [address]  [address]  [place] [address]    ACCEPT MEDICARE    Renny Bentley DDS  [address], [place] [address]  [phone]; [phone] (fax)  [employer]    Ayad Riddle DDS, MS   [facility]   [address]   [address]   [place] [address]   Appointments: [phone]   Fax: [phone]     ADDITIONAL PROVIDERS    Hussain Alejandra DDS   [facility]   [address]   [address]   [place] [address]   Appointments: [phone]   Fax: [phone]     Jacob Llanes DDS, MS   [facility]  [address]  [place] [address]   Appointments: [phone] Ext: [phone]  Fax: [phone]   [email or web address]

## 2024-02-01 NOTE — PROGRESS NOTES
Virtual Visit Details  Type of service: Video Visit   Start time: 8:33 AM  End time: 8:46 AM  Originating Location (pt. Location): Home  Distant Location (provider location): Off-site  Platform used for Video Visit: [facility]    Sleep Study Follow-Up Visit:    Date on this visit: 2/1/2024    Dejuan Barron comes in today for follow-up of his home sleep study done on 12/13/2023 for midsleep awakening, gasping at night, and snoring. He felt the night of the sleep study was an average night of sleep.           These findings were reviewed with patient.     Past medical/surgical history, family history, social history, medications and allergies were reviewed.      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 He has a copy of his results in hand. The results of his sleep study were reviewed in depth. Informed Dejuan he does not have significant obstructive sleep apnea or sleep associated hypoxemia. Snoring was present 15% of his study. We discussed oral appliance therapy for snoring. Dejuan is interested in an oral appliance depending on his insurance coverage.   Plan: Sleep Dental Referral  A sleep dental referral was placed. Dejuan is going to see if his insurance will cover any amount of an oral appliance otherwise he may try an over the counter oral appliance.     He will follow up with me in the future if symptoms worsen or fail to improve.     Total time spent reviewing medical records, history and physical examination, review of previous testing and interpretation as well as documentation on this date: 23 minutes      MELLISSA Hall CNP    CC: Bigg Tay

## 2024-02-01 NOTE — NURSING NOTE
Is the patient currently in the state of [place]? YES    Visit mode:VIDEO    If the visit is dropped, the patient can be reconnected by: VIDEO VISIT: Send to e-mail at: [email or web address]    Will anyone else be joining the visit? NO  (If patient encounters technical issues they should call [phone])    How would you like to obtain your AVS? MyChart    Are changes needed to the allergy or medication list? No    Reason for visit: BOB PADILLA

## 2024-04-19 ENCOUNTER — ANCILLARY PROCEDURE (OUTPATIENT)
Dept: GENERAL RADIOLOGY | Facility: CLINIC | Age: 39
End: 2024-04-19
Attending: FAMILY MEDICINE
Payer: COMMERCIAL

## 2024-04-19 ENCOUNTER — OFFICE VISIT (OUTPATIENT)
Dept: ORTHOPEDICS | Facility: CLINIC | Age: 39
End: 2024-04-19
Payer: COMMERCIAL

## 2024-04-19 VITALS — HEIGHT: 68 IN | WEIGHT: 199 LBS | BODY MASS INDEX: 30.16 KG/M2

## 2024-04-19 DIAGNOSIS — S49.92XA INJURY OF LEFT SHOULDER, INITIAL ENCOUNTER: ICD-10-CM

## 2024-04-19 DIAGNOSIS — S43.432D LABRAL TEAR OF SHOULDER, LEFT, SUBSEQUENT ENCOUNTER: ICD-10-CM

## 2024-04-19 DIAGNOSIS — M25.512 PAIN IN JOINT OF LEFT SHOULDER: ICD-10-CM

## 2024-04-19 DIAGNOSIS — S49.92XA INJURY OF LEFT SHOULDER, INITIAL ENCOUNTER: Primary | ICD-10-CM

## 2024-04-19 PROCEDURE — 73030 X-RAY EXAM OF SHOULDER: CPT | Mod: TC | Performed by: RADIOLOGY

## 2024-04-19 PROCEDURE — 99203 OFFICE O/P NEW LOW 30 MIN: CPT | Performed by: FAMILY MEDICINE

## 2024-04-19 SDOH — HEALTH STABILITY: PHYSICAL HEALTH: ON AVERAGE, HOW MANY MINUTES DO YOU ENGAGE IN EXERCISE AT THIS LEVEL?: 150+ MIN

## 2024-04-19 SDOH — HEALTH STABILITY: PHYSICAL HEALTH: ON AVERAGE, HOW MANY DAYS PER WEEK DO YOU ENGAGE IN MODERATE TO STRENUOUS EXERCISE (LIKE A BRISK WALK)?: 5 DAYS

## 2024-04-19 NOTE — LETTER
2024         RE: Dejuan Barron  [address]  [facility] [address]        Dear Colleague,    Thank you for referring your patient, Dejuan Barron, to the [facility]. Please see a copy of my visit note below.    Dejuan Barron  :  1985  DOS: 2024  MRN: [id number]    Sports Medicine Clinic Visit    PCP: Bigg Tay    Dejuan Barron is a 38 year old Right hand dominant male who is seen as a self referral presenting with left shoulder injury.    Injury: Patient describes injury as changed  doing pull ups, when noted sharp pain ~ 4 weeks ago, that has not been improving.  Pain located over left shoulder, AC joint, lateral shoulder.  Additional Features:  Positive: weakness and end range joint stiffness.  Symptoms are better with Rest.  Symptoms are worse with: shoulder flexion, lifting, lying on left shoulder, reaching overhead.  Other evaluation and/or treatments so far consists of: Ibuprofen and Rest.  Recent imaging completed: No recent imaging completed.  Prior History of related problems: history of left AC joint injury ~ 10 years ago.    Social History: currently employed as   Lifts weight 2 - 3 days/week, runs most days    Review of Systems  Musculoskeletal: as above  Remainder of review of systems is negative including constitutional, CV, pulmonary, GI, Skin and Neurologic except as noted in HPI or medical history.    Past Medical History:   Diagnosis Date     Fracture of great toe, right 2023     Varicella      Past Surgical History:   Procedure Laterality Date     ARTHROSCOPIC RECONSTRUCTION ANTERIOR CRUCIATE LIGAMENT Right      EXTRACTION(S) DENTAL       HERNIA REPAIR, INGUINAL RT/LT Bilateral     infancy     TREATMENT FISTULA ANAL       Family History   Problem Relation Age of Onset     Alcoholism Mother      Skin Cancer Father         squamous cell and pre-melanoma     Colon Polyps Maternal Grandfather          "maybe     Diabetes No family hx of      Glaucoma No family hx of      Macular Degeneration No family hx of      Prostate Cancer No family hx of        Objective  Ht 1.727 m (5' 8\")   Wt 90.3 kg (199 lb)   BMI 30.26 kg/m      General: healthy, alert and in no distress    HEENT: no scleral icterus or conjunctival erythema   Skin: no suspicious lesions or rash. No jaundice.   CV: regular rhythm by palpation, 2+ distal pulses, no pedal edema    Resp: normal respiratory effort without conversational dyspnea   Psych: normal mood and affect    Gait: nonantalgic, appropriate coordination and balance   Neuro: normal light touch sensory exam of the extremities. Motor strength as noted below     Left Shoulder exam    ROM:        Full active and passive ROM with flexion, extension, abduction, internal and external rotation.       Very mild pain with terminal flexion and milder with ER and IR    Tender:        Mild anterior GH joint    Non Tender:       remainder of shoulder       sternoclavicular joint       acromioclavicular joint       subacromial space       periscapular region    Strength:        abduction 5/5       internal rotation 5/5       external rotation 5/5       adduction 5/5    Impingement testing:        neg (-) Neer       neg (-) Tavera       neg (-) empty can       neg (-) crossover       positive (+) O'guillermo, mild       positive (+) crank, very mild    Stability testing:       neg (-) apprehension       neg (-) anterior glide       neg (-) sulcus sign       positive (+) posterior compression    Skin:       no visible deformities       well perfused       capillary refill brisk    Sensation:        normal sensation over shoulder and upper extremity       Radiology  Recent Results (from the past 744 hour(s))   XR Shoulder Left G/E 3 Views    Narrative    EXAM: XR SHOULDER LEFT G/E 3 VIEWS  LOCATION: [facility]  DATE: 4/19/2024    INDICATION:  Injury of left shoulder, initial " encounter  COMPARISON: None.      Impression    IMPRESSION: Hypertrophic changes of the distal clavicle which may be sequela of remote trauma. Acromioclavicular joint space is preserved and normally aligned. Glenohumeral joint space is preserved and normally aligned. No acute fracture. Globular   calcific density adjacent to the posterior aspect of the greater tuberosity suggesting calcific tendinosis.       Assessment:  1. Injury of left shoulder, initial encounter    2. Pain in joint of left shoulder    3. Labral tear of shoulder, left, subsequent encounter        Plan:  Discussed the assessment with the patient.  Follow up: prn with me based on progress  Relatively mild pain overall, adjusted workouts and noticing a positional click/pain  Clinical exam most suspicious for labral pathology, reviewed in detail  Reviewed option for advanced imaging if needed in the future, defer for now  PT encouraged and ordered today, reviewed goals  Oral Tylenol and topical Voltaren gel reviewed as safe OTC options, reviewed safe dosing strategies  XR images independently visualized and reviewed with patient today in clinic  Activity modifications strategies reviewed  We discussed modified progressive pain-free activity as tolerated  Home handouts provided and supportive care reviewed  All questions were answered today  Contact us with additional questions or concerns  Signs and sx of concern reviewed      Fco Garcia DO, CAQ  Sports Medicine Physician  [facility]      Disclaimer: This note consists of symbols derived from keyboarding, dictation and/or voice recognition software. As a result, there may be errors in the script that have gone undetected. Please consider this when interpreting information found in this chart.      Again, thank you for allowing me to participate in the care of your patient.        Sincerely,        Fco Garcia DO

## 2024-04-19 NOTE — PROGRESS NOTES
"Dejuan Barron  :  1985  DOS: 2024  MRN: [id number]    Sports Medicine Clinic Visit    PCP: Bigg Tay    Dejuan Barron is a 38 year old Right hand dominant male who is seen as a self referral presenting with left shoulder injury.    Injury: Patient describes injury as changed  doing pull ups, when noted sharp pain ~ 4 weeks ago, that has not been improving.  Pain located over left shoulder, AC joint, lateral shoulder.  Additional Features:  Positive: weakness and end range joint stiffness.  Symptoms are better with Rest.  Symptoms are worse with: shoulder flexion, lifting, lying on left shoulder, reaching overhead.  Other evaluation and/or treatments so far consists of: Ibuprofen and Rest.  Recent imaging completed: No recent imaging completed.  Prior History of related problems: history of left AC joint injury ~ 10 years ago.    Social History: currently employed as   Lifts weight 2 - 3 days/week, runs most days    Review of Systems  Musculoskeletal: as above  Remainder of review of systems is negative including constitutional, CV, pulmonary, GI, Skin and Neurologic except as noted in HPI or medical history.    Past Medical History:   Diagnosis Date    Fracture of great toe, right 2023    Varicella      Past Surgical History:   Procedure Laterality Date    ARTHROSCOPIC RECONSTRUCTION ANTERIOR CRUCIATE LIGAMENT Right     EXTRACTION(S) DENTAL      HERNIA REPAIR, INGUINAL RT/LT Bilateral     infancy    TREATMENT FISTULA ANAL       Family History   Problem Relation Age of Onset    Alcoholism Mother     Skin Cancer Father         squamous cell and pre-melanoma    Colon Polyps Maternal Grandfather         maybe    Diabetes No family hx of     Glaucoma No family hx of     Macular Degeneration No family hx of     Prostate Cancer No family hx of        Objective  Ht 1.727 m (5' 8\")   Wt 90.3 kg (199 lb)   BMI 30.26 kg/m      General: healthy, alert and in no distress    HEENT: " no scleral icterus or conjunctival erythema   Skin: no suspicious lesions or rash. No jaundice.   CV: regular rhythm by palpation, 2+ distal pulses, no pedal edema    Resp: normal respiratory effort without conversational dyspnea   Psych: normal mood and affect    Gait: nonantalgic, appropriate coordination and balance   Neuro: normal light touch sensory exam of the extremities. Motor strength as noted below     Left Shoulder exam    ROM:        Full active and passive ROM with flexion, extension, abduction, internal and external rotation.       Very mild pain with terminal flexion and milder with ER and IR    Tender:        Mild anterior GH joint    Non Tender:       remainder of shoulder       sternoclavicular joint       acromioclavicular joint       subacromial space       periscapular region    Strength:        abduction 5/5       internal rotation 5/5       external rotation 5/5       adduction 5/5    Impingement testing:        neg (-) Neer       neg (-) Tavera       neg (-) empty can       neg (-) crossover       positive (+) O'guillermo, mild       positive (+) crank, very mild    Stability testing:       neg (-) apprehension       neg (-) anterior glide       neg (-) sulcus sign       positive (+) posterior compression    Skin:       no visible deformities       well perfused       capillary refill brisk    Sensation:        normal sensation over shoulder and upper extremity       Radiology  Recent Results (from the past 744 hour(s))   XR Shoulder Left G/E 3 Views    Narrative    EXAM: XR SHOULDER LEFT G/E 3 VIEWS  LOCATION: [facility]  DATE: 4/19/2024    INDICATION:  Injury of left shoulder, initial encounter  COMPARISON: None.      Impression    IMPRESSION: Hypertrophic changes of the distal clavicle which may be sequela of remote trauma. Acromioclavicular joint space is preserved and normally aligned. Glenohumeral joint space is preserved and normally aligned. No acute  fracture. Globular   calcific density adjacent to the posterior aspect of the greater tuberosity suggesting calcific tendinosis.       Assessment:  1. Injury of left shoulder, initial encounter    2. Pain in joint of left shoulder    3. Labral tear of shoulder, left, subsequent encounter        Plan:  Discussed the assessment with the patient.  Follow up: prn with me based on progress  Relatively mild pain overall, adjusted workouts and noticing a positional click/pain  Clinical exam most suspicious for labral pathology, reviewed in detail  Reviewed option for advanced imaging if needed in the future, defer for now  PT encouraged and ordered today, reviewed goals  Oral Tylenol and topical Voltaren gel reviewed as safe OTC options, reviewed safe dosing strategies  XR images independently visualized and reviewed with patient today in clinic  Activity modifications strategies reviewed  We discussed modified progressive pain-free activity as tolerated  Home handouts provided and supportive care reviewed  All questions were answered today  Contact us with additional questions or concerns  Signs and sx of concern reviewed      Fco Garcia DO, SUE  Sports Medicine Physician  [facility]      Disclaimer: This note consists of symbols derived from keyboarding, dictation and/or voice recognition software. As a result, there may be errors in the script that have gone undetected. Please consider this when interpreting information found in this chart.

## 2024-04-23 ENCOUNTER — THERAPY VISIT (OUTPATIENT)
Dept: PHYSICAL THERAPY | Facility: REHABILITATION | Age: 39
End: 2024-04-23
Attending: FAMILY MEDICINE
Payer: COMMERCIAL

## 2024-04-23 DIAGNOSIS — M25.512 PAIN IN JOINT OF LEFT SHOULDER: ICD-10-CM

## 2024-04-23 DIAGNOSIS — S49.92XA INJURY OF LEFT SHOULDER, INITIAL ENCOUNTER: ICD-10-CM

## 2024-04-23 DIAGNOSIS — S43.432D LABRAL TEAR OF SHOULDER, LEFT, SUBSEQUENT ENCOUNTER: ICD-10-CM

## 2024-04-23 PROCEDURE — 97161 PT EVAL LOW COMPLEX 20 MIN: CPT | Mod: GP | Performed by: PHYSICAL THERAPIST

## 2024-04-23 PROCEDURE — 97110 THERAPEUTIC EXERCISES: CPT | Mod: GP | Performed by: PHYSICAL THERAPIST

## 2024-04-23 ASSESSMENT — ACTIVITIES OF DAILY LIVING (ADL): PLEASE_INDICATE_YOR_PRIMARY_REASON_FOR_REFERRAL_TO_THERAPY:: SHOULDER

## 2024-04-23 NOTE — PROGRESS NOTES
DISCHARGE  Reason for Discharge: Patient has failed to schedule further appointments. Patient only seen for the evaluation.    Equipment Issued: none    Discharge Plan: Patient to continue home program as tolerated and continue with MD as needed.    Referring Provider:  Fco Garcia    PHYSICAL THERAPY EVALUATION  Type of Visit: Evaluation    See electronic medical record for Abuse and Falls Screening details.    Subjective       Presenting condition or subjective complaint:    Date of onset: 03/23/24    Relevant medical history:     Dates & types of surgery:      Prior diagnostic imaging/testing results:       Prior therapy history for the same diagnosis, illness or injury:        Prior Level of Function  Transfers:   Ambulation:   ADL:   IADL:     Living Environment  Social support:     Type of home:     Stairs to enter the home:         Ramp:     Stairs inside the home:         Help at home:    Equipment owned:       Employment:      Hobbies/Interests:      Patient goals for therapy:      Pain in the left shoulder after pull up routine.  :    Possible labral tear and pain at rest pain with active flexion abduction and functional internal rotation.  History of grade 2 AC separation left shoulder  Objective   SHOULDER EVALUATION  PAIN: Pain Level at Rest: 0/10  Pain Level with Use: 5/10  Pain Location:   Pain Quality: aggravated pain with movement. Not sharp not dull moderately intense pain  Pain Frequency: can't sleep on his left side, 2 minutes then increase pain can last 10 minutes  Pain is Worst: pain even day and night  Pain is Exacerbated By: sidelying, reaching overhead behind back and out to the side end range horizontal adduction and shoulder abduction with tying shoes. Shoulder adduction with flexion arm across chest and elevate, also  shoulder extension.  Pain is Relieved By: none  Pain Progression: Worsened  Pain is on the lateral shoulder right under the deltoid tendon.    INTEGUMENTARY  (edema, incisions):   POSTURE:   GAIT:   Weightbearing Status:   Assistive Device(s):   Gait Deviations:   BALANCE/PROPRIOCEPTION:   WEIGHTBEARING ALIGNMENT:   ROM:    (Degrees) Left AROM Left PROM Right AROM  Right PROM   Shoulder Flexion 148  145    Shoulder Extension       Shoulder Abduction 153 4-5/10 pain laterally  152    Shoulder Adduction       Shoulder Internal Rotation T7  T9    Shoulder External Rotation 54-60  73    Shoulder Horizontal Abduction       Shoulder Horizontal Adduction Pain A/C joint mild  No pain    Shoulder Flexion ER       Shoulder Flexion IR       Elbow Extension       Elbow Flexion       Pain:   End feel:     STRENGTH:   Pain: - none + mild ++ moderate +++ severe  Strength Scale: 0-5/5 Left Right   Shoulder Flexion 5-, ++ (mod) 5   Shoulder Extension     Shoulder Abduction 5 5   Shoulder Adduction     Shoulder Internal Rotation 5 5   Shoulder External Rotation 5 5   Shoulder Horizontal Abduction     Shoulder Horizontal Adduction     Elbow Flexion     Elbow Extension     Mid Trap     Lower Trap     Rhomboid     Serratus Anterior     No tender ness to the touch  Elbow flexion and extension 5/5 bilaterally MMT patient feels like the left shoulder is weaker functionally than the right.   - juan alberto carla  - bursal pull test  -abduction with Internal rotation  Pain after Neers test when patient was firing his muscles to bring the arm back down but no pain at end range  Horizontal adduction pain at end range with overpressure.  FLEXIBILITY:   SPECIAL TESTS:   PALPATION:   JOINT MOBILITY:   CERVICAL SCREEN: WNL  Right shoulder posterior capsule is tight and S/C  joint tight on the left. Decreased control on the scapula with abduction both up ad down around 100-120 degress abduction neers pain on the way down but no pain at end range with overpressure.  Palpation glenohumeral joint region patient has significant tightness pectoralis muscles.  Pain with shoulder flexion left and decreased ROM ER  on the left    Assessment & Plan   CLINICAL IMPRESSIONS  Medical Diagnosis: Diagnosis  S49.92XA (ICD-10-CM) - Injury of left shoulder, initial encounter  M25.512 (ICD-10-CM) - Pain in joint of left shoulder  S43.432D (ICD-10-CM) - Labral tear of shoulder, left, subsequent encounter    Treatment Diagnosis: left shoulder strain   Impression/Assessment: Patient is a 38 year ol male with left shoulder pain laterally deep to the the deltoid tendon.  No tenderness with palpation.  Right shoulder posterior capsule is tight and S/C  joint tight on the left. Decreased control on the scapula with abduction both up ad down around 100-120 degress abduction neers pain on the way down but no pain at end range with overpressure.  Palpation glenohumeral joint region patient has significant tightness pectoralis muscles.  Pain with shoulder flexion left and decreased ROM ER on the left.  Pain is exacerbated with sidelying, reaching overhead behind back and out to the side end range horizontal adduction and shoulder abduction with tying shoes. Shoulder adduction with flexion arm across chest and elevate, also  shoulder extension.  Patient would benefit from skilled physical therapy to to address these issues.     Clinical Decision Making (Complexity):  Clinical Presentation: Stable/Uncomplicated  Clinical Presentation Rationale: based on medical and personal factors listed in PT evaluation  Clinical Decision Making (Complexity): Low complexity    PLAN OF CARE  Treatment Interventions:  Modalities: Cryotherapy, E-stim, Hot Pack, Ultrasound, check precautions before doing any modalities  Interventions: Manual Therapy, Neuromuscular Re-education, Therapeutic Activity, Therapeutic Exercise, Self-Care/Home Management, manual neural mobilization as needed    Long Term Goals     PT Goal 1  Goal Identifier: HEP  Goal Description: The patient will demonstrate independence in home exercise program to aid in home management of  symptoms.  Rationale: to maximize safety and independence with performance of ADLs and functional tasks  Target Date: 07/16/24  PT Goal 2  Goal Identifier: sidelying  Goal Description: Sidelying on left shoulder 30 minutes to aid in changing positions while sleeping with only a 2 out of 10 pain      Frequency of Treatment: 1 time per 2-3 weeks  Duration of Treatment: 12-24 weeks    Recommended Referrals to Other Professionals:   Education Assessment:   Learner/Method: Patient    Risks and benefits of evaluation/treatment have been explained.   Patient/Family/caregiver agrees with Plan of Care.     Evaluation Time:     PT Eval, Low Complexity Minutes (06242): 20       Signing Clinician: France Herrera PT

## 2024-04-27 ENCOUNTER — LAB (OUTPATIENT)
Dept: LAB | Facility: CLINIC | Age: 39
End: 2024-04-27
Payer: COMMERCIAL

## 2024-04-27 DIAGNOSIS — Z13.1 SCREENING FOR DIABETES MELLITUS: ICD-10-CM

## 2024-04-27 DIAGNOSIS — Z13.220 LIPID SCREENING: ICD-10-CM

## 2024-04-27 LAB
CHOLEST SERPL-MCNC: 171 MG/DL
FASTING STATUS PATIENT QL REPORTED: YES
HBA1C MFR BLD: 5.6 % (ref 0–5.6)
HDLC SERPL-MCNC: 41 MG/DL
LDLC SERPL CALC-MCNC: 113 MG/DL
NONHDLC SERPL-MCNC: 130 MG/DL
TRIGL SERPL-MCNC: 86 MG/DL

## 2024-04-27 PROCEDURE — 36415 COLL VENOUS BLD VENIPUNCTURE: CPT

## 2024-04-27 PROCEDURE — 83036 HEMOGLOBIN GLYCOSYLATED A1C: CPT

## 2024-04-27 PROCEDURE — 80061 LIPID PANEL: CPT

## 2024-05-09 ENCOUNTER — PATIENT OUTREACH (OUTPATIENT)
Dept: CARE COORDINATION | Facility: CLINIC | Age: 39
End: 2024-05-09
Payer: COMMERCIAL

## 2024-05-16 ENCOUNTER — MYC MEDICAL ADVICE (OUTPATIENT)
Dept: ORTHOPEDICS | Facility: CLINIC | Age: 39
End: 2024-05-16
Payer: COMMERCIAL

## 2024-05-16 DIAGNOSIS — S49.92XD INJURY OF SHOULDER, LEFT, SUBSEQUENT ENCOUNTER: ICD-10-CM

## 2024-05-16 DIAGNOSIS — S43.432D LABRAL TEAR OF SHOULDER, LEFT, SUBSEQUENT ENCOUNTER: Primary | ICD-10-CM

## 2024-05-16 DIAGNOSIS — M25.512 PAIN IN JOINT OF LEFT SHOULDER: ICD-10-CM

## 2024-05-16 NOTE — TELEPHONE ENCOUNTER
Reviewed with patient.  3T MRI ordered today.  Has PT available if desired, reviewed short term anti-inflammatory options.  All questions answered, will contact with MRI results once obtained.      Fco Garcia DO, CAQ  Sports Medicine Physician  [facility]

## 2024-05-23 ENCOUNTER — PATIENT OUTREACH (OUTPATIENT)
Dept: CARE COORDINATION | Facility: CLINIC | Age: 39
End: 2024-05-23
Payer: COMMERCIAL

## 2024-06-10 ENCOUNTER — ANCILLARY PROCEDURE (OUTPATIENT)
Dept: MRI IMAGING | Facility: CLINIC | Age: 39
End: 2024-06-10
Attending: FAMILY MEDICINE
Payer: COMMERCIAL

## 2024-06-10 DIAGNOSIS — S49.92XD INJURY OF SHOULDER, LEFT, SUBSEQUENT ENCOUNTER: ICD-10-CM

## 2024-06-10 DIAGNOSIS — S43.432D LABRAL TEAR OF SHOULDER, LEFT, SUBSEQUENT ENCOUNTER: ICD-10-CM

## 2024-06-10 DIAGNOSIS — M25.512 PAIN IN JOINT OF LEFT SHOULDER: ICD-10-CM

## 2024-06-10 PROCEDURE — 73221 MRI JOINT UPR EXTREM W/O DYE: CPT | Mod: LT | Performed by: RADIOLOGY

## 2024-07-28 ENCOUNTER — HEALTH MAINTENANCE LETTER (OUTPATIENT)
Age: 39
End: 2024-07-28

## 2024-12-05 ENCOUNTER — PATIENT OUTREACH (OUTPATIENT)
Dept: CARE COORDINATION | Facility: CLINIC | Age: 39
End: 2024-12-05
Payer: COMMERCIAL

## 2025-02-24 SDOH — HEALTH STABILITY: PHYSICAL HEALTH: ON AVERAGE, HOW MANY MINUTES DO YOU ENGAGE IN EXERCISE AT THIS LEVEL?: 40 MIN

## 2025-02-24 SDOH — HEALTH STABILITY: PHYSICAL HEALTH: ON AVERAGE, HOW MANY DAYS PER WEEK DO YOU ENGAGE IN MODERATE TO STRENUOUS EXERCISE (LIKE A BRISK WALK)?: 2 DAYS

## 2025-02-24 ASSESSMENT — SOCIAL DETERMINANTS OF HEALTH (SDOH): HOW OFTEN DO YOU GET TOGETHER WITH FRIENDS OR RELATIVES?: ONCE A WEEK

## 2025-02-25 ENCOUNTER — OFFICE VISIT (OUTPATIENT)
Dept: FAMILY MEDICINE | Facility: CLINIC | Age: 40
End: 2025-02-25
Payer: COMMERCIAL

## 2025-02-25 VITALS
BODY MASS INDEX: 30.27 KG/M2 | HEART RATE: 64 BPM | DIASTOLIC BLOOD PRESSURE: 81 MMHG | TEMPERATURE: 98.1 F | OXYGEN SATURATION: 98 % | HEIGHT: 69 IN | WEIGHT: 204.38 LBS | SYSTOLIC BLOOD PRESSURE: 120 MMHG | RESPIRATION RATE: 16 BRPM

## 2025-02-25 DIAGNOSIS — R73.03 PREDIABETES: ICD-10-CM

## 2025-02-25 DIAGNOSIS — Z80.8 FAMILY HISTORY OF SKIN CANCER: ICD-10-CM

## 2025-02-25 DIAGNOSIS — D18.01 CHERRY ANGIOMA: ICD-10-CM

## 2025-02-25 DIAGNOSIS — Z13.1 SCREENING FOR DIABETES MELLITUS: ICD-10-CM

## 2025-02-25 DIAGNOSIS — Z01.84 IMMUNITY STATUS TESTING: ICD-10-CM

## 2025-02-25 DIAGNOSIS — Z00.00 ROUTINE GENERAL MEDICAL EXAMINATION AT A HEALTH CARE FACILITY: Primary | ICD-10-CM

## 2025-02-25 LAB
EST. AVERAGE GLUCOSE BLD GHB EST-MCNC: 120 MG/DL
HBA1C MFR BLD: 5.8 % (ref 0–5.6)

## 2025-02-25 NOTE — PATIENT INSTRUCTIONS
The diabetes code by Avila Wagner      Patient Education   Preventive Care Advice   This is general advice given by our system to help you stay healthy. However, your care team may have specific advice just for you. Please talk to your care team about your preventive care needs.  Nutrition  Eat 5 or more servings of fruits and vegetables each day.  Try wheat bread, brown rice and whole grain pasta (instead of white bread, rice, and pasta).  Get enough calcium and vitamin D. Check the label on foods and aim for 100% of the RDA (recommended daily allowance).  Lifestyle  Exercise at least 150 minutes each week  (30 minutes a day, 5 days a week).  Do muscle strengthening activities 2 days a week. These help control your weight and prevent disease.  No smoking.  Wear sunscreen to prevent skin cancer.  Have a dental exam and cleaning every 6 months.  Yearly exams  See your health care team every year to talk about:  Any changes in your health.  Any medicines your care team has prescribed.  Preventive care, family planning, and ways to prevent chronic diseases.  Shots (vaccines)   HPV shots (up to age 26), if you've never had them before.  Hepatitis B shots (up to age 59), if you've never had them before.  COVID-19 shot: Get this shot when it's due.  Flu shot: Get a flu shot every year.  Tetanus shot: Get a tetanus shot every 10 years.  Pneumococcal, hepatitis A, and RSV shots: Ask your care team if you need these based on your risk.  Shingles shot (for age 50 and up)  General health tests  Diabetes screening:  Starting at age 35, Get screened for diabetes at least every 3 years.  If you are younger than age 35, ask your care team if you should be screened for diabetes.  Cholesterol test: At age 39, start having a cholesterol test every 5 years, or more often if advised.  Bone density scan (DEXA): At age 50, ask your care team if you should have this scan for osteoporosis (brittle bones).  Hepatitis C: Get tested at least  once in your life.  STIs (sexually transmitted infections)  Before age 24: Ask your care team if you should be screened for STIs.  After age 24: Get screened for STIs if you're at risk. You are at risk for STIs (including HIV) if:  You are sexually active with more than one person.  You don't use condoms every time.  You or a partner was diagnosed with a sexually transmitted infection.  If you are at risk for HIV, ask about PrEP medicine to prevent HIV.  Get tested for HIV at least once in your life, whether you are at risk for HIV or not.  Cancer screening tests  Cervical cancer screening: If you have a cervix, begin getting regular cervical cancer screening tests starting at age 21.  Breast cancer scan (mammogram): If you've ever had breasts, begin having regular mammograms starting at age 40. This is a scan to check for breast cancer.  Colon cancer screening: It is important to start screening for colon cancer at age 45.  Have a colonoscopy test every 10 years (or more often if you're at risk) Or, ask your provider about stool tests like a FIT test every year or Cologuard test every 3 years.  To learn more about your testing options, visit:   .  For help making a decision, visit:   https://bit.ly/ep84783.  Prostate cancer screening test: If you have a prostate, ask your care team if a prostate cancer screening test (PSA) at age 55 is right for you.  Lung cancer screening: If you are a current or former smoker ages 50 to 80, ask your care team if ongoing lung cancer screenings are right for you.  For informational purposes only. Not to replace the advice of your health care provider. Copyright   2023 [employer]. All rights reserved. Clinically reviewed by the [facility] Transitions Program. [employer] [id number] - REV 01/24.

## 2025-02-25 NOTE — PROGRESS NOTES
"Preventive Care Visit  [facility]  Armando Momin PA-C, Family Medicine  Feb 25, 2025      Assessment & Plan     Routine general medical examination at a health care facility  Overall stable wellness visit.  Weight is higher than ideal although not in obesity category.  Recommending continue to focus on diet and exercise habits.  Recheck annually with fasting labs today.  - Lipid panel reflex to direct LDL Fasting; Future  - Hemoglobin A1c; Future  - Lipid panel reflex to direct LDL Fasting  - Hemoglobin A1c    Screening for diabetes mellitus      Cherry angioma  Referral placed per request  - Adult Dermatology  Referral; Future    Family history of skin cancer  As above  - Adult Dermatology  Referral; Future    Prediabetes  Past history continue to monitor A1c.  This has been within normal limits the last 2 times at 5.6%.  - Hemoglobin A1c; Future  - Hemoglobin A1c    Immunity status testing  Age is in range of possible previous immunity from vaccine.  No records of this.  Will check immunity.  If necessary, consider vaccination in the future  - Hepatitis B Surface Antibody; Future  - Hepatitis B Surface Antibody    Patient has been advised of split billing requirements and indicates understanding: Yes        BMI  Estimated body mass index is 29.96 kg/m  as calculated from the following:    Height as of this encounter: 1.759 m (5' 9.25\").    Weight as of this encounter: 92.7 kg (204 lb 6 oz).   Weight management plan: Discussed healthy diet and exercise guidelines    Counseling  Appropriate preventive services were addressed with this patient via screening, questionnaire, or discussion as appropriate for fall prevention, nutrition, physical activity, Tobacco-use cessation, social engagement, weight loss and cognition.  Checklist reviewing preventive services available has been given to the patient.  Reviewed patient's diet, addressing concerns and/or questions. "   He is at risk for lack of exercise and has been provided with information to increase physical activity for the benefit of his well-being.   The patient was instructed to see the dentist every 6 months.         Randall Zaidi is a 39 year old, presenting for the following:  Physical (Fasting for labs.)        2/25/2025    11:50 AM   Additional Questions   Roomed by Layla THAKUR CMA   Accompanied by Self          HPI  Patient is a 39-year-old male with a past history of prediabetes as recent as 2020 which is improved with lifestyle modifications.  He also is a past history of cherry angioma on his scalp and a family history of squamous cell carcinoma in father.  He is requesting dermatology referral as well as his wellness visit    Health Care Directive  Patient does not have a Health Care Directive: Patient states has Advance Directive and will bring in a copy to clinic.      2/24/2025   General Health   How would you rate your overall physical health? (!) FAIR   Feel stress (tense, anxious, or unable to sleep) Only a little   (!) STRESS CONCERN      2/24/2025   Nutrition   Three or more servings of calcium each day? Yes   Diet: Regular (no restrictions)   How many servings of fruit and vegetables per day? (!) 2-3   How many sweetened beverages each day? 0-1         2/24/2025   Exercise   Days per week of moderate/strenous exercise 2 days   Average minutes spent exercising at this level 40 min   (!) EXERCISE CONCERN      2/24/2025   Social Factors   Frequency of gathering with friends or relatives Once a week   Worry food won't last until get money to buy more No   Food not last or not have enough money for food? No   Do you have housing? (Housing is defined as stable permanent housing and does not include staying ouside in a car, in a tent, in an abandoned building, in an overnight shelter, or couch-surfing.) Yes   Are you worried about losing your housing? No   Lack of transportation? No   Unable to get  "utilities (heat,electricity)? No         2/24/2025   Dental   Dentist two times every year? (!) NO            Today's PHQ-2 Score:       2/24/2025    12:21 PM   PHQ-2 ( 1999 [employer])   Q1: Little interest or pleasure in doing things 0   Q2: Feeling down, depressed or hopeless 0   PHQ-2 Score 0    Q1: Little interest or pleasure in doing things Not at all   Q2: Feeling down, depressed or hopeless Not at all   PHQ-2 Score 0       Patient-reported           2/24/2025   Substance Use   Alcohol more than 3/day or more than 7/wk No   Do you use any other substances recreationally? No     Social History     Tobacco Use    Smoking status: Never     Passive exposure: Never    Smokeless tobacco: Never   Vaping Use    Vaping status: Never Used   Substance Use Topics    Alcohol use: Yes     Alcohol/week: 2.0 standard drinks of alcohol     Types: 2 Standard drinks or equivalent per week     Comment: 3 per month    Drug use: Not Currently           2/24/2025   STI Screening   New sexual partner(s) since last STI/HIV test? No         2/24/2025   Contraception/Family Planning   Questions about contraception or family planning No        Reviewed and updated as needed this visit by Provider   Tobacco  Allergies  Meds  Problems  Med Hx  Surg Hx  Fam Hx               Objective    Exam  /81 (BP Location: Left arm, Patient Position: Sitting, Cuff Size: Adult Regular)   Pulse 64   Temp 98.1  F (36.7  C) (Oral)   Resp 16   Ht 1.759 m (5' 9.25\")   Wt 92.7 kg (204 lb 6 oz)   SpO2 98%   BMI 29.96 kg/m     Estimated body mass index is 29.96 kg/m  as calculated from the following:    Height as of this encounter: 1.759 m (5' 9.25\").    Weight as of this encounter: 92.7 kg (204 lb 6 oz).    Physical Exam  GENERAL: alert and no distress  EYES: Eyes grossly normal to inspection, PERRL and conjunctivae and sclerae normal  HENT: ear canals and TM's normal, nose and mouth without ulcers or lesions  NECK: no adenopathy, no asymmetry, " masses, or scars  RESP: lungs clear to auscultation - no rales, rhonchi or wheezes  CV: regular rate and rhythm, normal S1 S2, no S3 or S4, no murmur, click or rub, no peripheral edema  ABDOMEN: soft, nontender, no hepatosplenomegaly, no masses and bowel sounds normal  MS: no gross musculoskeletal defects noted, no edema  SKIN: no suspicious lesions or rashes  NEURO: Normal strength and tone, mentation intact and speech normal  PSYCH: mentation appears normal, affect normal/bright  LYMPH: no cervical adenopathy        Signed Electronically by: Armando Momin PA-C  The longitudinal plan of care for the diagnosis(es)/condition(s) as documented were addressed during this visit. Due to the added complexity in care, I will continue to support Dejuan in the subsequent management and with ongoing continuity of care.

## 2025-02-26 LAB
CHOLEST SERPL-MCNC: 187 MG/DL
FASTING STATUS PATIENT QL REPORTED: ABNORMAL
HBV SURFACE AB SERPL IA-ACNC: >1000 M[IU]/ML
HBV SURFACE AB SERPL IA-ACNC: REACTIVE M[IU]/ML
HDLC SERPL-MCNC: 43 MG/DL
LDLC SERPL CALC-MCNC: 122 MG/DL
NONHDLC SERPL-MCNC: 144 MG/DL
TRIGL SERPL-MCNC: 108 MG/DL